# Patient Record
Sex: FEMALE | Race: WHITE | NOT HISPANIC OR LATINO | Employment: UNEMPLOYED | URBAN - METROPOLITAN AREA
[De-identification: names, ages, dates, MRNs, and addresses within clinical notes are randomized per-mention and may not be internally consistent; named-entity substitution may affect disease eponyms.]

---

## 2017-01-22 ENCOUNTER — CONVERSION ENCOUNTER (OUTPATIENT)
Dept: URGENT CARE | Facility: CLINIC | Age: 32
End: 2017-01-22

## 2017-01-23 ENCOUNTER — CONVERSION ENCOUNTER (OUTPATIENT)
Dept: URGENT CARE | Facility: CLINIC | Age: 32
End: 2017-01-23

## 2017-09-07 ENCOUNTER — CONVERSION ENCOUNTER (OUTPATIENT)
Dept: URGENT CARE | Facility: CLINIC | Age: 32
End: 2017-09-07

## 2017-09-29 ENCOUNTER — CONVERSION ENCOUNTER (OUTPATIENT)
Dept: URGENT CARE | Facility: CLINIC | Age: 32
End: 2017-09-29

## 2018-04-13 ENCOUNTER — CONVERSION ENCOUNTER (OUTPATIENT)
Dept: URGENT CARE | Facility: CLINIC | Age: 33
End: 2018-04-13

## 2018-04-26 ENCOUNTER — CONVERSION ENCOUNTER (OUTPATIENT)
Dept: URGENT CARE | Facility: CLINIC | Age: 33
End: 2018-04-26

## 2019-06-04 VITALS
HEART RATE: 84 BPM | SYSTOLIC BLOOD PRESSURE: 126 MMHG | SYSTOLIC BLOOD PRESSURE: 123 MMHG | HEART RATE: 99 BPM | OXYGEN SATURATION: 100 % | BODY MASS INDEX: 21.35 KG/M2 | WEIGHT: 113 LBS | HEIGHT: 61 IN | BODY MASS INDEX: 21.52 KG/M2 | HEART RATE: 87 BPM | OXYGEN SATURATION: 98 % | SYSTOLIC BLOOD PRESSURE: 103 MMHG | DIASTOLIC BLOOD PRESSURE: 90 MMHG | RESPIRATION RATE: 16 BRPM | WEIGHT: 114 LBS | OXYGEN SATURATION: 97 % | RESPIRATION RATE: 16 BRPM | OXYGEN SATURATION: 100 % | HEART RATE: 88 BPM | WEIGHT: 116 LBS | OXYGEN SATURATION: 100 % | DIASTOLIC BLOOD PRESSURE: 85 MMHG | BODY MASS INDEX: 21.34 KG/M2 | HEIGHT: 62 IN | RESPIRATION RATE: 20 BRPM | SYSTOLIC BLOOD PRESSURE: 150 MMHG | OXYGEN SATURATION: 100 % | WEIGHT: 116.4 LBS | WEIGHT: 114 LBS | HEART RATE: 68 BPM | SYSTOLIC BLOOD PRESSURE: 132 MMHG | HEART RATE: 91 BPM | DIASTOLIC BLOOD PRESSURE: 82 MMHG | RESPIRATION RATE: 16 BRPM | SYSTOLIC BLOOD PRESSURE: 110 MMHG | RESPIRATION RATE: 16 BRPM | DIASTOLIC BLOOD PRESSURE: 72 MMHG | HEIGHT: 61 IN | DIASTOLIC BLOOD PRESSURE: 80 MMHG | DIASTOLIC BLOOD PRESSURE: 64 MMHG

## 2019-07-31 ENCOUNTER — HOSPITAL ENCOUNTER (EMERGENCY)
Facility: HOSPITAL | Age: 34
Discharge: HOME | End: 2019-07-31
Admitting: EMERGENCY MEDICINE

## 2019-07-31 ENCOUNTER — APPOINTMENT (OUTPATIENT)
Dept: ULTRASOUND IMAGING | Facility: HOSPITAL | Age: 34
End: 2019-07-31

## 2019-07-31 VITALS
HEART RATE: 83 BPM | TEMPERATURE: 98.7 F | RESPIRATION RATE: 16 BRPM | WEIGHT: 115.3 LBS | SYSTOLIC BLOOD PRESSURE: 107 MMHG | BODY MASS INDEX: 21.77 KG/M2 | HEIGHT: 61 IN | OXYGEN SATURATION: 100 % | DIASTOLIC BLOOD PRESSURE: 73 MMHG

## 2019-07-31 DIAGNOSIS — R51.9 HEADACHE, UNSPECIFIED HEADACHE TYPE: Primary | ICD-10-CM

## 2019-07-31 DIAGNOSIS — O20.0 THREATENED ABORTION: ICD-10-CM

## 2019-07-31 LAB
ABO GROUP BLD: NORMAL
ALBUMIN SERPL-MCNC: 4.1 G/DL (ref 3.5–4.8)
ALBUMIN/GLOB SERPL: 1.3 G/DL (ref 1–1.7)
ALP SERPL-CCNC: 36 U/L (ref 32–91)
ALT SERPL W P-5'-P-CCNC: 17 U/L (ref 14–54)
ANION GAP SERPL CALCULATED.3IONS-SCNC: 13.7 MMOL/L (ref 5–15)
AST SERPL-CCNC: 17 U/L (ref 15–41)
B-HCG UR QL: POSITIVE
BASOPHILS # BLD AUTO: 0 10*3/MM3 (ref 0–0.2)
BASOPHILS NFR BLD AUTO: 0.5 % (ref 0–1.5)
BILIRUB SERPL-MCNC: 0.8 MG/DL (ref 0.3–1.2)
BUN BLD-MCNC: 10 MG/DL (ref 8–20)
BUN/CREAT SERPL: 16.7 (ref 5.4–26.2)
C TRACH RRNA CVX QL NAA+PROBE: NOT DETECTED
CALCIUM SPEC-SCNC: 9.3 MG/DL (ref 8.9–10.3)
CHLORIDE SERPL-SCNC: 105 MMOL/L (ref 101–111)
CLUE CELLS SPEC QL WET PREP: ABNORMAL
CO2 SERPL-SCNC: 19 MMOL/L (ref 22–32)
CREAT BLD-MCNC: 0.6 MG/DL (ref 0.4–1)
DEPRECATED RDW RBC AUTO: 38.9 FL (ref 37–54)
EOSINOPHIL # BLD AUTO: 0.1 10*3/MM3 (ref 0–0.4)
EOSINOPHIL NFR BLD AUTO: 0.6 % (ref 0.3–6.2)
ERYTHROCYTE [DISTWIDTH] IN BLOOD BY AUTOMATED COUNT: 12.7 % (ref 12.3–15.4)
GFR SERPL CREATININE-BSD FRML MDRD: 115 ML/MIN/1.73
GLOBULIN UR ELPH-MCNC: 3.1 GM/DL (ref 2.5–3.8)
GLUCOSE BLD-MCNC: 94 MG/DL (ref 65–99)
HCG INTACT+B SERPL-ACNC: NORMAL MIU/ML
HCT VFR BLD AUTO: 37.9 % (ref 34–46.6)
HGB BLD-MCNC: 13.2 G/DL (ref 12–15.9)
HOLD SPECIMEN: NORMAL
HOLD SPECIMEN: NORMAL
HYDATID CYST SPEC WET PREP: ABNORMAL
LYMPHOCYTES # BLD AUTO: 1.1 10*3/MM3 (ref 0.7–3.1)
LYMPHOCYTES NFR BLD AUTO: 14.2 % (ref 19.6–45.3)
MCH RBC QN AUTO: 30.3 PG (ref 26.6–33)
MCHC RBC AUTO-ENTMCNC: 34.8 G/DL (ref 31.5–35.7)
MCV RBC AUTO: 87.3 FL (ref 79–97)
MONOCYTES # BLD AUTO: 0.5 10*3/MM3 (ref 0.1–0.9)
MONOCYTES NFR BLD AUTO: 6.5 % (ref 5–12)
N GONORRHOEA RRNA SPEC QL NAA+PROBE: NOT DETECTED
NEUTROPHILS # BLD AUTO: 6.3 10*3/MM3 (ref 1.7–7)
NEUTROPHILS NFR BLD AUTO: 78.2 % (ref 42.7–76)
NRBC BLD AUTO-RTO: 0 /100 WBC (ref 0–0.2)
PLATELET # BLD AUTO: 256 10*3/MM3 (ref 140–450)
PMV BLD AUTO: 8.3 FL (ref 6–12)
POTASSIUM BLD-SCNC: 3.7 MMOL/L (ref 3.6–5.1)
PROT SERPL-MCNC: 7.2 G/DL (ref 6.1–7.9)
RBC # BLD AUTO: 4.34 10*6/MM3 (ref 3.77–5.28)
RH BLD: NEGATIVE
SODIUM BLD-SCNC: 134 MMOL/L (ref 136–144)
T VAGINALIS SPEC QL WET PREP: ABNORMAL
WBC NRBC COR # BLD: 8 10*3/MM3 (ref 3.4–10.8)
WBC SPEC QL WET PREP: ABNORMAL
WHOLE BLOOD HOLD SPECIMEN: NORMAL
WHOLE BLOOD HOLD SPECIMEN: NORMAL
YEAST GENITAL QL WET PREP: ABNORMAL

## 2019-07-31 PROCEDURE — 85025 COMPLETE CBC W/AUTO DIFF WBC: CPT | Performed by: NURSE PRACTITIONER

## 2019-07-31 PROCEDURE — 96361 HYDRATE IV INFUSION ADD-ON: CPT

## 2019-07-31 PROCEDURE — 87591 N.GONORRHOEAE DNA AMP PROB: CPT | Performed by: NURSE PRACTITIONER

## 2019-07-31 PROCEDURE — 87491 CHLMYD TRACH DNA AMP PROBE: CPT | Performed by: NURSE PRACTITIONER

## 2019-07-31 PROCEDURE — 25010000002 METOCLOPRAMIDE PER 10 MG: Performed by: NURSE PRACTITIONER

## 2019-07-31 PROCEDURE — 84702 CHORIONIC GONADOTROPIN TEST: CPT | Performed by: NURSE PRACTITIONER

## 2019-07-31 PROCEDURE — 99284 EMERGENCY DEPT VISIT MOD MDM: CPT

## 2019-07-31 PROCEDURE — 86900 BLOOD TYPING SEROLOGIC ABO: CPT | Performed by: NURSE PRACTITIONER

## 2019-07-31 PROCEDURE — 87210 SMEAR WET MOUNT SALINE/INK: CPT | Performed by: NURSE PRACTITIONER

## 2019-07-31 PROCEDURE — 96374 THER/PROPH/DIAG INJ IV PUSH: CPT

## 2019-07-31 PROCEDURE — 76817 TRANSVAGINAL US OBSTETRIC: CPT

## 2019-07-31 PROCEDURE — 86901 BLOOD TYPING SEROLOGIC RH(D): CPT | Performed by: NURSE PRACTITIONER

## 2019-07-31 PROCEDURE — 81025 URINE PREGNANCY TEST: CPT | Performed by: NURSE PRACTITIONER

## 2019-07-31 PROCEDURE — 76801 OB US < 14 WKS SINGLE FETUS: CPT

## 2019-07-31 PROCEDURE — 80053 COMPREHEN METABOLIC PANEL: CPT | Performed by: NURSE PRACTITIONER

## 2019-07-31 RX ORDER — SODIUM CHLORIDE 0.9 % (FLUSH) 0.9 %
10 SYRINGE (ML) INJECTION AS NEEDED
Status: DISCONTINUED | OUTPATIENT
Start: 2019-07-31 | End: 2019-07-31 | Stop reason: HOSPADM

## 2019-07-31 RX ORDER — METOCLOPRAMIDE HYDROCHLORIDE 5 MG/ML
10 INJECTION INTRAMUSCULAR; INTRAVENOUS ONCE
Status: COMPLETED | OUTPATIENT
Start: 2019-07-31 | End: 2019-07-31

## 2019-07-31 RX ORDER — PROMETHAZINE HYDROCHLORIDE 12.5 MG/1
12.5 TABLET ORAL EVERY 8 HOURS PRN
Qty: 15 TABLET | Refills: 0 | Status: SHIPPED | OUTPATIENT
Start: 2019-07-31 | End: 2019-08-05 | Stop reason: SDUPTHER

## 2019-07-31 RX ORDER — SODIUM CHLORIDE 9 MG/ML
125 INJECTION, SOLUTION INTRAVENOUS CONTINUOUS
Status: DISCONTINUED | OUTPATIENT
Start: 2019-07-31 | End: 2019-07-31 | Stop reason: HOSPADM

## 2019-07-31 RX ADMIN — METOCLOPRAMIDE 10 MG: 5 INJECTION, SOLUTION INTRAMUSCULAR; INTRAVENOUS at 12:39

## 2019-07-31 RX ADMIN — SODIUM CHLORIDE 1000 ML: 900 INJECTION, SOLUTION INTRAVENOUS at 12:43

## 2019-08-05 ENCOUNTER — APPOINTMENT (OUTPATIENT)
Dept: ULTRASOUND IMAGING | Facility: HOSPITAL | Age: 34
End: 2019-08-05

## 2019-08-05 ENCOUNTER — HOSPITAL ENCOUNTER (EMERGENCY)
Facility: HOSPITAL | Age: 34
Discharge: HOME | End: 2019-08-05
Admitting: EMERGENCY MEDICINE

## 2019-08-05 VITALS
HEIGHT: 61 IN | TEMPERATURE: 99.8 F | RESPIRATION RATE: 18 BRPM | BODY MASS INDEX: 21.81 KG/M2 | HEART RATE: 92 BPM | OXYGEN SATURATION: 97 % | DIASTOLIC BLOOD PRESSURE: 78 MMHG | WEIGHT: 115.52 LBS | SYSTOLIC BLOOD PRESSURE: 112 MMHG

## 2019-08-05 DIAGNOSIS — R11.2 NAUSEA AND VOMITING, INTRACTABILITY OF VOMITING NOT SPECIFIED, UNSPECIFIED VOMITING TYPE: ICD-10-CM

## 2019-08-05 DIAGNOSIS — Z3A.01 LESS THAN 8 WEEKS GESTATION OF PREGNANCY: ICD-10-CM

## 2019-08-05 DIAGNOSIS — R50.9 FEVER, UNSPECIFIED FEVER CAUSE: Primary | ICD-10-CM

## 2019-08-05 LAB
ALBUMIN SERPL-MCNC: 4 G/DL (ref 3.5–4.8)
ALBUMIN/GLOB SERPL: 1.3 G/DL (ref 1–1.7)
ALP SERPL-CCNC: 35 U/L (ref 32–91)
ALT SERPL W P-5'-P-CCNC: 15 U/L (ref 14–54)
ANION GAP SERPL CALCULATED.3IONS-SCNC: 15.6 MMOL/L (ref 5–15)
AST SERPL-CCNC: 16 U/L (ref 15–41)
BACTERIA UR QL AUTO: ABNORMAL /HPF
BASOPHILS # BLD AUTO: 0 10*3/MM3 (ref 0–0.2)
BASOPHILS NFR BLD AUTO: 0.2 % (ref 0–1.5)
BILIRUB SERPL-MCNC: 0.8 MG/DL (ref 0.3–1.2)
BILIRUB UR QL STRIP: ABNORMAL
BUN BLD-MCNC: 6 MG/DL (ref 8–20)
BUN/CREAT SERPL: 12 (ref 5.4–26.2)
CALCIUM SPEC-SCNC: 9.3 MG/DL (ref 8.9–10.3)
CHLORIDE SERPL-SCNC: 101 MMOL/L (ref 101–111)
CLARITY UR: ABNORMAL
CO2 SERPL-SCNC: 19 MMOL/L (ref 22–32)
COLOR UR: ABNORMAL
CREAT BLD-MCNC: 0.5 MG/DL (ref 0.4–1)
D-LACTATE SERPL-SCNC: 0.8 MMOL/L (ref 0.5–2)
DEPRECATED RDW RBC AUTO: 39.4 FL (ref 37–54)
EOSINOPHIL # BLD AUTO: 0 10*3/MM3 (ref 0–0.4)
EOSINOPHIL NFR BLD AUTO: 0 % (ref 0.3–6.2)
ERYTHROCYTE [DISTWIDTH] IN BLOOD BY AUTOMATED COUNT: 12.9 % (ref 12.3–15.4)
GFR SERPL CREATININE-BSD FRML MDRD: 142 ML/MIN/1.73
GLOBULIN UR ELPH-MCNC: 3 GM/DL (ref 2.5–3.8)
GLUCOSE BLD-MCNC: 95 MG/DL (ref 65–99)
GLUCOSE UR STRIP-MCNC: NEGATIVE MG/DL
HCG INTACT+B SERPL-ACNC: NORMAL MIU/ML
HCT VFR BLD AUTO: 38.3 % (ref 34–46.6)
HGB BLD-MCNC: 13.2 G/DL (ref 12–15.9)
HGB UR QL STRIP.AUTO: ABNORMAL
HYALINE CASTS UR QL AUTO: ABNORMAL /LPF
KETONES UR QL STRIP: ABNORMAL
LEUKOCYTE ESTERASE UR QL STRIP.AUTO: ABNORMAL
LIPASE SERPL-CCNC: 25 U/L (ref 22–51)
LYMPHOCYTES # BLD AUTO: 0.6 10*3/MM3 (ref 0.7–3.1)
LYMPHOCYTES NFR BLD AUTO: 6.9 % (ref 19.6–45.3)
MCH RBC QN AUTO: 29.9 PG (ref 26.6–33)
MCHC RBC AUTO-ENTMCNC: 34.4 G/DL (ref 31.5–35.7)
MCV RBC AUTO: 87 FL (ref 79–97)
MONOCYTES # BLD AUTO: 0.8 10*3/MM3 (ref 0.1–0.9)
MONOCYTES NFR BLD AUTO: 8.7 % (ref 5–12)
MUCOUS THREADS URNS QL MICRO: ABNORMAL /HPF
NEUTROPHILS # BLD AUTO: 7.5 10*3/MM3 (ref 1.7–7)
NEUTROPHILS NFR BLD AUTO: 84.2 % (ref 42.7–76)
NITRITE UR QL STRIP: NEGATIVE
NRBC BLD AUTO-RTO: 0 /100 WBC (ref 0–0.2)
PH UR STRIP.AUTO: 6.5 [PH] (ref 5–8)
PLATELET # BLD AUTO: 221 10*3/MM3 (ref 140–450)
PMV BLD AUTO: 8.3 FL (ref 6–12)
POTASSIUM BLD-SCNC: 3.6 MMOL/L (ref 3.6–5.1)
PROT SERPL-MCNC: 7 G/DL (ref 6.1–7.9)
PROT UR QL STRIP: ABNORMAL
RBC # BLD AUTO: 4.4 10*6/MM3 (ref 3.77–5.28)
RBC # UR: ABNORMAL /HPF
REF LAB TEST METHOD: ABNORMAL
SODIUM BLD-SCNC: 132 MMOL/L (ref 136–144)
SP GR UR STRIP: 1.03 (ref 1–1.03)
SQUAMOUS #/AREA URNS HPF: ABNORMAL /HPF
UROBILINOGEN UR QL STRIP: ABNORMAL
WBC NRBC COR # BLD: 8.9 10*3/MM3 (ref 3.4–10.8)
WBC UR QL AUTO: ABNORMAL /HPF

## 2019-08-05 PROCEDURE — 76802 OB US < 14 WKS ADDL FETUS: CPT

## 2019-08-05 PROCEDURE — 83690 ASSAY OF LIPASE: CPT | Performed by: PHYSICIAN ASSISTANT

## 2019-08-05 PROCEDURE — 84702 CHORIONIC GONADOTROPIN TEST: CPT | Performed by: PHYSICIAN ASSISTANT

## 2019-08-05 PROCEDURE — 25010000002 ONDANSETRON PER 1 MG: Performed by: PHYSICIAN ASSISTANT

## 2019-08-05 PROCEDURE — 99284 EMERGENCY DEPT VISIT MOD MDM: CPT

## 2019-08-05 PROCEDURE — 96361 HYDRATE IV INFUSION ADD-ON: CPT

## 2019-08-05 PROCEDURE — 76817 TRANSVAGINAL US OBSTETRIC: CPT

## 2019-08-05 PROCEDURE — 96374 THER/PROPH/DIAG INJ IV PUSH: CPT

## 2019-08-05 PROCEDURE — 85025 COMPLETE CBC W/AUTO DIFF WBC: CPT | Performed by: PHYSICIAN ASSISTANT

## 2019-08-05 PROCEDURE — 76801 OB US < 14 WKS SINGLE FETUS: CPT

## 2019-08-05 PROCEDURE — 80053 COMPREHEN METABOLIC PANEL: CPT | Performed by: PHYSICIAN ASSISTANT

## 2019-08-05 PROCEDURE — 83605 ASSAY OF LACTIC ACID: CPT

## 2019-08-05 PROCEDURE — 81001 URINALYSIS AUTO W/SCOPE: CPT | Performed by: PHYSICIAN ASSISTANT

## 2019-08-05 RX ORDER — CEFDINIR 300 MG/1
300 CAPSULE ORAL 2 TIMES DAILY
Qty: 14 CAPSULE | Refills: 0 | Status: SHIPPED | OUTPATIENT
Start: 2019-08-05 | End: 2019-08-25

## 2019-08-05 RX ORDER — SODIUM CHLORIDE 0.9 % (FLUSH) 0.9 %
10 SYRINGE (ML) INJECTION AS NEEDED
Status: DISCONTINUED | OUTPATIENT
Start: 2019-08-05 | End: 2019-08-05 | Stop reason: HOSPADM

## 2019-08-05 RX ORDER — ONDANSETRON 2 MG/ML
4 INJECTION INTRAMUSCULAR; INTRAVENOUS ONCE
Status: COMPLETED | OUTPATIENT
Start: 2019-08-05 | End: 2019-08-05

## 2019-08-05 RX ORDER — PROMETHAZINE HYDROCHLORIDE 12.5 MG/1
12.5 TABLET ORAL EVERY 8 HOURS PRN
Qty: 15 TABLET | Refills: 0 | Status: SHIPPED | OUTPATIENT
Start: 2019-08-05 | End: 2019-08-10

## 2019-08-05 RX ORDER — ACETAMINOPHEN 500 MG
1000 TABLET ORAL ONCE
Status: COMPLETED | OUTPATIENT
Start: 2019-08-05 | End: 2019-08-05

## 2019-08-05 RX ADMIN — ONDANSETRON 4 MG: 2 INJECTION INTRAMUSCULAR; INTRAVENOUS at 17:29

## 2019-08-05 RX ADMIN — ACETAMINOPHEN 1000 MG: 500 TABLET, FILM COATED ORAL at 20:08

## 2019-08-05 RX ADMIN — SODIUM CHLORIDE 1000 ML: 900 INJECTION, SOLUTION INTRAVENOUS at 17:29

## 2019-08-06 NOTE — DISCHARGE INSTRUCTIONS
Follow-up with your primary care provider in 3-5 days.  If you do not have a primary care provider call [phone] for help in finding one, or you may follow up with [facility] at [phone].    Keep your appointment with OB/GYN on Wednesday.    Tylenol as needed for fever.  Drink Plenty of clear fluids    Return To ED for any new or worsening symptoms.      Take Cefdinir as prescribed.  Be sure to take full course.

## 2019-08-24 ENCOUNTER — APPOINTMENT (OUTPATIENT)
Dept: ULTRASOUND IMAGING | Facility: HOSPITAL | Age: 34
End: 2019-08-24

## 2019-08-24 ENCOUNTER — HOSPITAL ENCOUNTER (EMERGENCY)
Facility: HOSPITAL | Age: 34
Discharge: HOME | End: 2019-08-24
Admitting: EMERGENCY MEDICINE

## 2019-08-24 VITALS
RESPIRATION RATE: 16 BRPM | DIASTOLIC BLOOD PRESSURE: 62 MMHG | HEIGHT: 61 IN | SYSTOLIC BLOOD PRESSURE: 109 MMHG | WEIGHT: 114.2 LBS | BODY MASS INDEX: 21.56 KG/M2 | OXYGEN SATURATION: 99 % | HEART RATE: 99 BPM | TEMPERATURE: 98.9 F

## 2019-08-24 DIAGNOSIS — O20.0 THREATENED MISCARRIAGE: ICD-10-CM

## 2019-08-24 DIAGNOSIS — O20.9 VAGINAL BLEEDING IN PREGNANCY, FIRST TRIMESTER: Primary | ICD-10-CM

## 2019-08-24 LAB
ABO GROUP BLD: NORMAL
ALBUMIN SERPL-MCNC: 4 G/DL (ref 3.5–4.8)
ALBUMIN/GLOB SERPL: 1.2 G/DL (ref 1–1.7)
ALP SERPL-CCNC: 40 U/L (ref 32–91)
ALT SERPL W P-5'-P-CCNC: 15 U/L (ref 14–54)
ANION GAP SERPL CALCULATED.3IONS-SCNC: 18.1 MMOL/L (ref 5–15)
AST SERPL-CCNC: 17 U/L (ref 15–41)
BACTERIA UR QL AUTO: ABNORMAL /HPF
BASOPHILS # BLD AUTO: 0.1 10*3/MM3 (ref 0–0.2)
BASOPHILS NFR BLD AUTO: 0.5 % (ref 0–1.5)
BILIRUB SERPL-MCNC: 0.6 MG/DL (ref 0.3–1.2)
BILIRUB UR QL STRIP: NEGATIVE
BUN BLD-MCNC: 8 MG/DL (ref 8–20)
BUN/CREAT SERPL: 13.3 (ref 5.4–26.2)
CALCIUM SPEC-SCNC: 9.5 MG/DL (ref 8.9–10.3)
CHLORIDE SERPL-SCNC: 102 MMOL/L (ref 101–111)
CLARITY UR: CLEAR
CO2 SERPL-SCNC: 21 MMOL/L (ref 22–32)
COLOR UR: YELLOW
CREAT BLD-MCNC: 0.6 MG/DL (ref 0.4–1)
CRP SERPL-MCNC: 0.05 MG/DL (ref 0–0.7)
DEPRECATED RDW RBC AUTO: 40.3 FL (ref 37–54)
EOSINOPHIL # BLD AUTO: 0.1 10*3/MM3 (ref 0–0.4)
EOSINOPHIL NFR BLD AUTO: 1.3 % (ref 0.3–6.2)
ERYTHROCYTE [DISTWIDTH] IN BLOOD BY AUTOMATED COUNT: 13.3 % (ref 12.3–15.4)
GFR SERPL CREATININE-BSD FRML MDRD: 115 ML/MIN/1.73
GLOBULIN UR ELPH-MCNC: 3.3 GM/DL (ref 2.5–3.8)
GLUCOSE BLD-MCNC: 92 MG/DL (ref 65–99)
GLUCOSE UR STRIP-MCNC: NEGATIVE MG/DL
HCG INTACT+B SERPL-ACNC: NORMAL MIU/ML
HCT VFR BLD AUTO: 36 % (ref 34–46.6)
HGB BLD-MCNC: 12.6 G/DL (ref 12–15.9)
HGB UR QL STRIP.AUTO: ABNORMAL
HOLD SPECIMEN: NORMAL
HYALINE CASTS UR QL AUTO: ABNORMAL /LPF
KETONES UR QL STRIP: ABNORMAL
LEUKOCYTE ESTERASE UR QL STRIP.AUTO: NEGATIVE
LIPASE SERPL-CCNC: 26 U/L (ref 22–51)
LYMPHOCYTES # BLD AUTO: 1.9 10*3/MM3 (ref 0.7–3.1)
LYMPHOCYTES NFR BLD AUTO: 18.5 % (ref 19.6–45.3)
MCH RBC QN AUTO: 29.9 PG (ref 26.6–33)
MCHC RBC AUTO-ENTMCNC: 35 G/DL (ref 31.5–35.7)
MCV RBC AUTO: 85.6 FL (ref 79–97)
MONOCYTES # BLD AUTO: 0.6 10*3/MM3 (ref 0.1–0.9)
MONOCYTES NFR BLD AUTO: 5.6 % (ref 5–12)
NEUTROPHILS # BLD AUTO: 7.8 10*3/MM3 (ref 1.7–7)
NEUTROPHILS NFR BLD AUTO: 74.1 % (ref 42.7–76)
NITRITE UR QL STRIP: NEGATIVE
NRBC BLD AUTO-RTO: 0 /100 WBC (ref 0–0.2)
PH UR STRIP.AUTO: 6.5 [PH] (ref 5–8)
PLATELET # BLD AUTO: 390 10*3/MM3 (ref 140–450)
PMV BLD AUTO: 8 FL (ref 6–12)
POTASSIUM BLD-SCNC: 4.1 MMOL/L (ref 3.6–5.1)
PROT SERPL-MCNC: 7.3 G/DL (ref 6.1–7.9)
PROT UR QL STRIP: NEGATIVE
RBC # BLD AUTO: 4.2 10*6/MM3 (ref 3.77–5.28)
RBC # UR: ABNORMAL /HPF
REF LAB TEST METHOD: ABNORMAL
RH BLD: NEGATIVE
SODIUM BLD-SCNC: 137 MMOL/L (ref 136–144)
SP GR UR STRIP: <=1.005 (ref 1–1.03)
SQUAMOUS #/AREA URNS HPF: ABNORMAL /HPF
UROBILINOGEN UR QL STRIP: ABNORMAL
WBC NRBC COR # BLD: 10.5 10*3/MM3 (ref 3.4–10.8)
WBC UR QL AUTO: ABNORMAL /HPF

## 2019-08-24 PROCEDURE — 83690 ASSAY OF LIPASE: CPT | Performed by: PHYSICIAN ASSISTANT

## 2019-08-24 PROCEDURE — 96375 TX/PRO/DX INJ NEW DRUG ADDON: CPT

## 2019-08-24 PROCEDURE — 99284 EMERGENCY DEPT VISIT MOD MDM: CPT

## 2019-08-24 PROCEDURE — 25010000002 ONDANSETRON PER 1 MG: Performed by: NURSE PRACTITIONER

## 2019-08-24 PROCEDURE — 80053 COMPREHEN METABOLIC PANEL: CPT | Performed by: PHYSICIAN ASSISTANT

## 2019-08-24 PROCEDURE — 86900 BLOOD TYPING SEROLOGIC ABO: CPT | Performed by: PHYSICIAN ASSISTANT

## 2019-08-24 PROCEDURE — 84702 CHORIONIC GONADOTROPIN TEST: CPT | Performed by: PHYSICIAN ASSISTANT

## 2019-08-24 PROCEDURE — 25010000002 ONDANSETRON PER 1 MG: Performed by: PHYSICIAN ASSISTANT

## 2019-08-24 PROCEDURE — 96374 THER/PROPH/DIAG INJ IV PUSH: CPT

## 2019-08-24 PROCEDURE — 96376 TX/PRO/DX INJ SAME DRUG ADON: CPT

## 2019-08-24 PROCEDURE — 86901 BLOOD TYPING SEROLOGIC RH(D): CPT | Performed by: PHYSICIAN ASSISTANT

## 2019-08-24 PROCEDURE — 85025 COMPLETE CBC W/AUTO DIFF WBC: CPT | Performed by: PHYSICIAN ASSISTANT

## 2019-08-24 PROCEDURE — 76801 OB US < 14 WKS SINGLE FETUS: CPT

## 2019-08-24 PROCEDURE — 81001 URINALYSIS AUTO W/SCOPE: CPT | Performed by: PHYSICIAN ASSISTANT

## 2019-08-24 PROCEDURE — 86140 C-REACTIVE PROTEIN: CPT | Performed by: NURSE PRACTITIONER

## 2019-08-24 PROCEDURE — 96361 HYDRATE IV INFUSION ADD-ON: CPT

## 2019-08-24 PROCEDURE — 25010000002 MORPHINE PER 10 MG: Performed by: NURSE PRACTITIONER

## 2019-08-24 RX ORDER — SODIUM CHLORIDE 0.9 % (FLUSH) 0.9 %
10 SYRINGE (ML) INJECTION AS NEEDED
Status: DISCONTINUED | OUTPATIENT
Start: 2019-08-24 | End: 2019-08-25 | Stop reason: HOSPADM

## 2019-08-24 RX ORDER — HYDROCODONE BITARTRATE AND ACETAMINOPHEN 5; 325 MG/1; MG/1
1 TABLET ORAL EVERY 4 HOURS PRN
Qty: 15 TABLET | Refills: 0 | Status: SHIPPED | OUTPATIENT
Start: 2019-08-24 | End: 2019-08-25

## 2019-08-24 RX ORDER — ONDANSETRON 4 MG/1
4 TABLET, ORALLY DISINTEGRATING ORAL EVERY 6 HOURS PRN
Qty: 15 TABLET | Refills: 0 | Status: SHIPPED | OUTPATIENT
Start: 2019-08-24 | End: 2019-08-25

## 2019-08-24 RX ORDER — MORPHINE SULFATE 4 MG/ML
4 INJECTION, SOLUTION INTRAMUSCULAR; INTRAVENOUS ONCE
Status: COMPLETED | OUTPATIENT
Start: 2019-08-24 | End: 2019-08-24

## 2019-08-24 RX ORDER — ONDANSETRON 2 MG/ML
4 INJECTION INTRAMUSCULAR; INTRAVENOUS ONCE
Status: COMPLETED | OUTPATIENT
Start: 2019-08-24 | End: 2019-08-24

## 2019-08-24 RX ADMIN — SODIUM CHLORIDE 1000 ML: 0.9 INJECTION, SOLUTION INTRAVENOUS at 21:25

## 2019-08-24 RX ADMIN — MORPHINE SULFATE 4 MG: 4 INJECTION INTRAVENOUS at 23:06

## 2019-08-24 RX ADMIN — ONDANSETRON 4 MG: 2 INJECTION INTRAMUSCULAR; INTRAVENOUS at 21:03

## 2019-08-24 RX ADMIN — SODIUM CHLORIDE 1000 ML: 0.9 INJECTION, SOLUTION INTRAVENOUS at 20:25

## 2019-08-24 RX ADMIN — ONDANSETRON 4 MG: 2 INJECTION INTRAMUSCULAR; INTRAVENOUS at 20:26

## 2019-08-24 RX ADMIN — MORPHINE SULFATE 4 MG: 4 INJECTION INTRAVENOUS at 21:02

## 2019-08-25 ENCOUNTER — APPOINTMENT (OUTPATIENT)
Dept: ULTRASOUND IMAGING | Facility: HOSPITAL | Age: 34
End: 2019-08-25

## 2019-08-25 ENCOUNTER — HOSPITAL ENCOUNTER (OUTPATIENT)
Facility: HOSPITAL | Age: 34
Setting detail: OBSERVATION
Discharge: HOME | End: 2019-08-26
Attending: EMERGENCY MEDICINE | Admitting: OBSTETRICS & GYNECOLOGY

## 2019-08-25 DIAGNOSIS — O03.9 MISCARRIAGE: Primary | ICD-10-CM

## 2019-08-25 LAB
ABO GROUP BLD: NORMAL
BASOPHILS # BLD AUTO: 0 10*3/MM3 (ref 0–0.2)
BASOPHILS NFR BLD AUTO: 0.4 % (ref 0–1.5)
BLD GP AB SCN SERPL QL: POSITIVE
DEPRECATED RDW RBC AUTO: 39.8 FL (ref 37–54)
EOSINOPHIL # BLD AUTO: 0.1 10*3/MM3 (ref 0–0.4)
EOSINOPHIL NFR BLD AUTO: 0.6 % (ref 0.3–6.2)
ERYTHROCYTE [DISTWIDTH] IN BLOOD BY AUTOMATED COUNT: 13.2 % (ref 12.3–15.4)
HCG INTACT+B SERPL-ACNC: NORMAL MIU/ML
HCT VFR BLD AUTO: 32.9 % (ref 34–46.6)
HGB BLD-MCNC: 11.8 G/DL (ref 12–15.9)
LYMPHOCYTES # BLD AUTO: 1.2 10*3/MM3 (ref 0.7–3.1)
LYMPHOCYTES NFR BLD AUTO: 8.7 % (ref 19.6–45.3)
MCH RBC QN AUTO: 30.8 PG (ref 26.6–33)
MCHC RBC AUTO-ENTMCNC: 36 G/DL (ref 31.5–35.7)
MCV RBC AUTO: 85.6 FL (ref 79–97)
MONOCYTES # BLD AUTO: 0.6 10*3/MM3 (ref 0.1–0.9)
MONOCYTES NFR BLD AUTO: 4.6 % (ref 5–12)
NEUTROPHILS # BLD AUTO: 11.8 10*3/MM3 (ref 1.7–7)
NEUTROPHILS NFR BLD AUTO: 85.7 % (ref 42.7–76)
NRBC BLD AUTO-RTO: 0 /100 WBC (ref 0–0.2)
PLATELET # BLD AUTO: 360 10*3/MM3 (ref 140–450)
PMV BLD AUTO: 7.9 FL (ref 6–12)
RBC # BLD AUTO: 3.84 10*6/MM3 (ref 3.77–5.28)
RESIDUAL RHIG DETECTED: NORMAL
RH BLD: NEGATIVE
T&S EXPIRATION DATE: NORMAL
WBC NRBC COR # BLD: 13.8 10*3/MM3 (ref 3.4–10.8)
WHOLE BLOOD HOLD SPECIMEN: NORMAL

## 2019-08-25 PROCEDURE — 96374 THER/PROPH/DIAG INJ IV PUSH: CPT

## 2019-08-25 PROCEDURE — 86850 RBC ANTIBODY SCREEN: CPT | Performed by: EMERGENCY MEDICINE

## 2019-08-25 PROCEDURE — 76817 TRANSVAGINAL US OBSTETRIC: CPT

## 2019-08-25 PROCEDURE — 25010000002 KETOROLAC TROMETHAMINE PER 15 MG: Performed by: EMERGENCY MEDICINE

## 2019-08-25 PROCEDURE — 86901 BLOOD TYPING SEROLOGIC RH(D): CPT | Performed by: EMERGENCY MEDICINE

## 2019-08-25 PROCEDURE — 99284 EMERGENCY DEPT VISIT MOD MDM: CPT

## 2019-08-25 PROCEDURE — 86900 BLOOD TYPING SEROLOGIC ABO: CPT | Performed by: EMERGENCY MEDICINE

## 2019-08-25 PROCEDURE — G0378 HOSPITAL OBSERVATION PER HR: HCPCS

## 2019-08-25 PROCEDURE — 84702 CHORIONIC GONADOTROPIN TEST: CPT | Performed by: EMERGENCY MEDICINE

## 2019-08-25 PROCEDURE — 25010000002 ONDANSETRON PER 1 MG: Performed by: EMERGENCY MEDICINE

## 2019-08-25 PROCEDURE — 76815 OB US LIMITED FETUS(S): CPT

## 2019-08-25 PROCEDURE — 86870 RBC ANTIBODY IDENTIFICATION: CPT | Performed by: EMERGENCY MEDICINE

## 2019-08-25 PROCEDURE — 96375 TX/PRO/DX INJ NEW DRUG ADDON: CPT

## 2019-08-25 PROCEDURE — 96376 TX/PRO/DX INJ SAME DRUG ADON: CPT

## 2019-08-25 PROCEDURE — 25010000002 MORPHINE PER 10 MG: Performed by: EMERGENCY MEDICINE

## 2019-08-25 PROCEDURE — 85025 COMPLETE CBC W/AUTO DIFF WBC: CPT | Performed by: EMERGENCY MEDICINE

## 2019-08-25 PROCEDURE — 25010000002 LORAZEPAM PER 2 MG: Performed by: EMERGENCY MEDICINE

## 2019-08-25 PROCEDURE — 25010000002 MORPHINE PER 10 MG: Performed by: OBSTETRICS & GYNECOLOGY

## 2019-08-25 RX ORDER — MORPHINE SULFATE 4 MG/ML
4 INJECTION, SOLUTION INTRAMUSCULAR; INTRAVENOUS ONCE
Status: COMPLETED | OUTPATIENT
Start: 2019-08-25 | End: 2019-08-25

## 2019-08-25 RX ORDER — MORPHINE SULFATE 4 MG/ML
2 INJECTION, SOLUTION INTRAMUSCULAR; INTRAVENOUS
Status: DISCONTINUED | OUTPATIENT
Start: 2019-08-25 | End: 2019-08-26

## 2019-08-25 RX ORDER — LORAZEPAM 2 MG/ML
0.5 INJECTION INTRAMUSCULAR ONCE
Status: COMPLETED | OUTPATIENT
Start: 2019-08-25 | End: 2019-08-25

## 2019-08-25 RX ORDER — SODIUM CHLORIDE 0.9 % (FLUSH) 0.9 %
10 SYRINGE (ML) INJECTION AS NEEDED
Status: DISCONTINUED | OUTPATIENT
Start: 2019-08-25 | End: 2019-08-26 | Stop reason: HOSPADM

## 2019-08-25 RX ORDER — KETOROLAC TROMETHAMINE 15 MG/ML
15 INJECTION, SOLUTION INTRAMUSCULAR; INTRAVENOUS ONCE
Status: COMPLETED | OUTPATIENT
Start: 2019-08-25 | End: 2019-08-25

## 2019-08-25 RX ORDER — ONDANSETRON 2 MG/ML
4 INJECTION INTRAMUSCULAR; INTRAVENOUS ONCE
Status: COMPLETED | OUTPATIENT
Start: 2019-08-25 | End: 2019-08-25

## 2019-08-25 RX ORDER — ONDANSETRON 2 MG/ML
4 INJECTION INTRAMUSCULAR; INTRAVENOUS EVERY 6 HOURS PRN
Status: DISCONTINUED | OUTPATIENT
Start: 2019-08-25 | End: 2019-08-26 | Stop reason: HOSPADM

## 2019-08-25 RX ORDER — ESCITALOPRAM OXALATE 10 MG/1
10 TABLET ORAL DAILY
COMMUNITY
End: 2020-05-13 | Stop reason: ALTCHOICE

## 2019-08-25 RX ADMIN — LORAZEPAM 0.5 MG: 2 INJECTION INTRAMUSCULAR; INTRAVENOUS at 19:31

## 2019-08-25 RX ADMIN — MORPHINE SULFATE 2 MG: 4 INJECTION INTRAVENOUS at 23:11

## 2019-08-25 RX ADMIN — SODIUM CHLORIDE 1000 ML: 900 INJECTION, SOLUTION INTRAVENOUS at 18:39

## 2019-08-25 RX ADMIN — ONDANSETRON 4 MG: 2 INJECTION INTRAMUSCULAR; INTRAVENOUS at 18:39

## 2019-08-25 RX ADMIN — MORPHINE SULFATE 4 MG: 4 INJECTION INTRAVENOUS at 18:40

## 2019-08-25 RX ADMIN — MORPHINE SULFATE 4 MG: 4 INJECTION INTRAVENOUS at 19:14

## 2019-08-25 RX ADMIN — KETOROLAC TROMETHAMINE 15 MG: 15 INJECTION, SOLUTION INTRAMUSCULAR; INTRAVENOUS at 19:14

## 2019-08-26 VITALS
HEIGHT: 61 IN | TEMPERATURE: 98.1 F | DIASTOLIC BLOOD PRESSURE: 65 MMHG | SYSTOLIC BLOOD PRESSURE: 112 MMHG | OXYGEN SATURATION: 98 % | RESPIRATION RATE: 17 BRPM | HEART RATE: 83 BPM | WEIGHT: 113.76 LBS | BODY MASS INDEX: 21.48 KG/M2

## 2019-08-26 LAB
BASOPHILS # BLD AUTO: 0 10*3/MM3 (ref 0–0.2)
BASOPHILS NFR BLD AUTO: 0.5 % (ref 0–1.5)
DEPRECATED RDW RBC AUTO: 41.1 FL (ref 37–54)
EOSINOPHIL # BLD AUTO: 0.2 10*3/MM3 (ref 0–0.4)
EOSINOPHIL NFR BLD AUTO: 1.5 % (ref 0.3–6.2)
ERYTHROCYTE [DISTWIDTH] IN BLOOD BY AUTOMATED COUNT: 13.4 % (ref 12.3–15.4)
HCG INTACT+B SERPL-ACNC: NORMAL MIU/ML
HCT VFR BLD AUTO: 27.9 % (ref 34–46.6)
HGB BLD-MCNC: 9.9 G/DL (ref 12–15.9)
LYMPHOCYTES # BLD AUTO: 2.2 10*3/MM3 (ref 0.7–3.1)
LYMPHOCYTES NFR BLD AUTO: 22.9 % (ref 19.6–45.3)
MCH RBC QN AUTO: 30.8 PG (ref 26.6–33)
MCHC RBC AUTO-ENTMCNC: 35.5 G/DL (ref 31.5–35.7)
MCV RBC AUTO: 86.7 FL (ref 79–97)
MONOCYTES # BLD AUTO: 0.8 10*3/MM3 (ref 0.1–0.9)
MONOCYTES NFR BLD AUTO: 8.6 % (ref 5–12)
NEUTROPHILS # BLD AUTO: 6.5 10*3/MM3 (ref 1.7–7)
NEUTROPHILS NFR BLD AUTO: 66.5 % (ref 42.7–76)
NRBC BLD AUTO-RTO: 0.1 /100 WBC (ref 0–0.2)
PLATELET # BLD AUTO: 291 10*3/MM3 (ref 140–450)
PMV BLD AUTO: 7.9 FL (ref 6–12)
RBC # BLD AUTO: 3.22 10*6/MM3 (ref 3.77–5.28)
WBC NRBC COR # BLD: 9.7 10*3/MM3 (ref 3.4–10.8)

## 2019-08-26 PROCEDURE — 96376 TX/PRO/DX INJ SAME DRUG ADON: CPT

## 2019-08-26 PROCEDURE — 84702 CHORIONIC GONADOTROPIN TEST: CPT | Performed by: OBSTETRICS & GYNECOLOGY

## 2019-08-26 PROCEDURE — G0378 HOSPITAL OBSERVATION PER HR: HCPCS

## 2019-08-26 PROCEDURE — 85025 COMPLETE CBC W/AUTO DIFF WBC: CPT | Performed by: OBSTETRICS & GYNECOLOGY

## 2019-08-26 PROCEDURE — 25010000002 MORPHINE PER 10 MG: Performed by: OBSTETRICS & GYNECOLOGY

## 2019-08-26 RX ORDER — CYCLOBENZAPRINE HCL 10 MG
5 TABLET ORAL ONCE
Status: COMPLETED | OUTPATIENT
Start: 2019-08-26 | End: 2019-08-26

## 2019-08-26 RX ORDER — OXYCODONE HYDROCHLORIDE 5 MG/1
5 TABLET ORAL EVERY 4 HOURS PRN
Qty: 10 TABLET | Refills: 0 | Status: SHIPPED | OUTPATIENT
Start: 2019-08-26 | End: 2019-09-05

## 2019-08-26 RX ORDER — OXYCODONE HYDROCHLORIDE 5 MG/1
5 TABLET ORAL EVERY 4 HOURS PRN
Status: DISCONTINUED | OUTPATIENT
Start: 2019-08-26 | End: 2019-08-26 | Stop reason: HOSPADM

## 2019-08-26 RX ADMIN — OXYCODONE HYDROCHLORIDE 5 MG: 5 TABLET ORAL at 09:20

## 2019-08-26 RX ADMIN — CYCLOBENZAPRINE HYDROCHLORIDE 5 MG: 10 TABLET, FILM COATED ORAL at 11:07

## 2019-08-26 RX ADMIN — MORPHINE SULFATE 2 MG: 4 INJECTION INTRAVENOUS at 04:42

## 2019-08-26 RX ADMIN — MORPHINE SULFATE 2 MG: 4 INJECTION INTRAVENOUS at 07:16

## 2019-08-27 ENCOUNTER — READMISSION MANAGEMENT (OUTPATIENT)
Dept: CALL CENTER | Facility: HOSPITAL | Age: 34
End: 2019-08-27

## 2019-08-27 NOTE — OUTREACH NOTE
Prep Survey      Responses   Facility patient discharged from?  [facility]   Is patient eligible?  No   What are the reasons patient is not eligible?  Other [OB PATIENT WITH MISCARRIAGE]   Does the patient have one of the following disease processes/diagnoses(primary or secondary)?  Other   Prep survey completed?  Yes          Marizol Molina LPN

## 2020-05-13 ENCOUNTER — TELEMEDICINE (OUTPATIENT)
Dept: PSYCHIATRY | Facility: CLINIC | Age: 35
End: 2020-05-13

## 2020-05-13 DIAGNOSIS — F33.1 MAJOR DEPRESSIVE DISORDER, RECURRENT EPISODE, MODERATE (HCC): Primary | ICD-10-CM

## 2020-05-13 DIAGNOSIS — F41.1 GENERALIZED ANXIETY DISORDER: ICD-10-CM

## 2020-05-13 PROBLEM — O03.9 MISCARRIAGE: Status: RESOLVED | Noted: 2019-08-25 | Resolved: 2020-05-13

## 2020-05-13 PROCEDURE — 90792 PSYCH DIAG EVAL W/MED SRVCS: CPT | Performed by: PHYSICIAN ASSISTANT

## 2020-05-13 RX ORDER — BUSPIRONE HYDROCHLORIDE 10 MG/1
10 TABLET ORAL 2 TIMES DAILY
Qty: 60 TABLET | Refills: 2 | Status: SHIPPED | OUTPATIENT
Start: 2020-05-13 | End: 2020-07-13 | Stop reason: SDUPTHER

## 2020-05-13 NOTE — PROGRESS NOTES
"Subjective   Ora Lynn is a 35 y.o. female who presents today for initial evaluation via video visit x 45 minutes    Chief Complaint:  Anxiety and depression    History of Present Illness:   14 month old son (Ford), Dr. Afua Moe recommended, had post-partum depression, started her on Zoloft but was scared to take during pregnancy   hasn't gotten to enjoy being a mom  Got pregnant again and had a miscarriage in Sept 2019 No , denied for assistance, only works part-time, lost her full-time job    \"I have had it my whole life but fought it\"  Crying for no reason  Depression 7/10  Anxiety 8/10  Denies SI/HI  No billie or hypomania  Gets anxious around people  Single Mom, her mother is 8 hrs away in [place], [place] and they are close  Always afraid, worries about her mom dying or getting into an accident, or her son getting sick, getting into an accident  Terrified of taking medication  Triggers: raising son alone, her ex (has his own anxiety and drinks too much, makes her more anxious)    The following portions of the patient's history were reviewed and updated as appropriate: allergies, current medications, past family history, past medical history, past social history, past surgical history and problem list.    PAST PSYCHIATRIC HISTORY  Axis I  Affective/Bipoloar Disorder, Anxiety/Panic Disorder  Axis II  None    PAST OUTPATIENT TREATMENT  Diagnosis treated:  Affective Disorder, Anxiety/Panic Disorder  Treatment Type:  Individual Therapy, Medication Management, Supportive Counseling (Personal Counseling Services briefly)  No hospitalizations  Prior Psychiatric Medications:  Zoloft, really didn't take routinely  Lexapro, did not help  Support Groups:  None  Sequelae Of Mental Disorder:  social isolation, family disruption, emotional distress      Interval History  No Change    Side Effects  None      Past Medical History:  Past Medical History:   Diagnosis Date   • Depression        Social " History:  Social History     Socioeconomic History   • Marital status: Single     Spouse name: Not on file   • Number of children: Not on file   • Years of education: Not on file   • Highest education level: Not on file   Tobacco Use   • Smoking status: Never Smoker   • Smokeless tobacco: Never Used   Substance and Sexual Activity   • Alcohol use: Yes     Alcohol/week: 1.0 standard drinks     Types: 1 Glasses of wine per week     Comment: socially   • Drug use: No   • Sexual activity: Not Currently     Partners: Male       Family History:  Family History   Problem Relation Age of Onset   • Alcohol abuse Father        Past Surgical History:  History reviewed. No pertinent surgical history.    Problem List:  Patient Active Problem List   Diagnosis   • Miscarriage       Allergy:   No Known Allergies     Discontinued Medications:  Medications Discontinued During This Encounter   Medication Reason   • escitalopram (LEXAPRO) 10 MG tablet Alternate therapy       Current Medications:   Current Outpatient Medications   Medication Sig Dispense Refill   • BLISOVI FE 1/20 1-20 MG-MCG per tablet      • busPIRone (BUSPAR) 10 MG tablet Take 1 tablet by mouth 2 (Two) Times a Day. 60 tablet 2     No current facility-administered medications for this visit.          Review of Symptoms:    Psychiatric/Behavioral: Negative for agitation, behavioral problems, confusion, decreased concentration, hallucinations, self-injury, sleep disturbance and suicidal ideas. The patient is nervous/anxious with dysphoric mood and is not hyperactive.        Physical Exam:   not currently breastfeeding.    Mental Status Exam:   Hygiene:   good  Cooperation:  Cooperative  Eye Contact:  Good  Psychomotor Behavior:  Appropriate  Affect:  Appropriate  Mood: sad, anxious  Hopelessness: Denies  Speech:  Normal  Thought Process:  Goal directed  Thought Content:  Normal  Suicidal:  None  Homicidal:  None  Hallucinations:  None  Delusion:  None  Memory:   Intact  Orientation:  Person, Place, Time and Situation  Reliability:  good  Insight:  Good  Judgement:  Good  Impulse Control:  Good  Physical/Medical Issues:  No        PHQ-9 Depression Screening  Little interest or pleasure in doing things? 2   Feeling down, depressed, or hopeless? 3   Trouble falling or staying asleep, or sleeping too much? 1   Feeling tired or having little energy? 2   Poor appetite or overeating? 1   Feeling bad about yourself - or that you are a failure or have let yourself or your family down? 2   Trouble concentrating on things, such as reading the newspaper or watching television? 1   Moving or speaking so slowly that other people could have noticed? Or the opposite - being so fidgety or restless that you have been moving around a lot more than usual? 0   Thoughts that you would be better off dead, or of hurting yourself in some way? 0   PHQ-9 Total Score 12   If you checked off any problems, how difficult have these problems made it for you to do your work, take care of things at home, or get along with other people? Very difficult           Never smoker    I advised Ora of the risks of tobacco use.     Lab Results:   Admission on 03/09/2020, Discharged on 03/09/2020   Component Date Value Ref Range Status   • Rapid Influenza A Ag 03/09/2020 Negative  Negative Final   • Rapid Influenza B Ag 03/09/2020 Negative  Negative Final   • Internal Control 03/09/2020 Passed  Passed Final   • Lot Number 03/09/2020 [id number]   Final   • Expiration Date 03/09/2020 11/17/2022   Final       Assessment/Plan   Problems Addressed this Visit     None      Visit Diagnoses     Major depressive disorder, recurrent episode, moderate (CMS/HCC)    -  Primary    Relevant Medications    busPIRone (BUSPAR) 10 MG tablet    Generalized anxiety disorder        Relevant Medications    busPIRone (BUSPAR) 10 MG tablet          Visit Diagnoses:    ICD-10-CM ICD-9-CM   1. Major depressive disorder, recurrent episode,  moderate (CMS/HCC) F33.1 296.32   2. Generalized anxiety disorder F41.1 300.02       TREATMENT PLAN/GOALS: Continue supportive psychotherapy efforts and medications as indicated. Treatment and medication options discussed during today's visit. Patient ackowledged and verbally consented to continue with current treatment plan and was educated on the importance of compliance with treatment and follow-up appointments.    MEDICATION ISSUES:  INSPECT reviewed as expected  Discussed medication options and treatment plan of prescribed medication as well as the risks, benefits, and side effects including potential falls, possible impaired driving and metabolic adversities among others. Patient is agreeable to call the office with any worsening of symptoms or onset of side effects. Patient is agreeable to call 911 or go to the nearest ER should he/she begin having SI/HI. No medication side effects or related complaints today.     Patient has long hx of depression and anxiety but has not had it treated properly, scared of taking meds that are not right for her.  Will have [employer] testing kit sent to her house to assess the best practice medications for her.  Will call her to discuss which antidepressant recommended for her.  Send referral list for counselors.  Start Buspar 10mg BID for anxiety in the meantime    MEDS ORDERED DURING VISIT:  New Medications Ordered This Visit   Medications   • busPIRone (BUSPAR) 10 MG tablet     Sig: Take 1 tablet by mouth 2 (Two) Times a Day.     Dispense:  60 tablet     Refill:  2       Return in about 2 months (around 7/13/2020) for Recheck.         This document has been electronically signed by Yadira Power PA-C  May 13, 2020 12:33

## 2020-05-14 NOTE — PROGRESS NOTES
I have reviewed the notes, assessments, and/or procedures performed byYadira Power, I concur with her/his documentation of Ora Lynn.

## 2020-06-01 ENCOUNTER — TELEPHONE (OUTPATIENT)
Dept: PSYCHIATRY | Facility: CLINIC | Age: 35
End: 2020-06-01

## 2020-06-01 DIAGNOSIS — F41.1 GENERALIZED ANXIETY DISORDER: ICD-10-CM

## 2020-06-01 DIAGNOSIS — F33.1 MAJOR DEPRESSIVE DISORDER, RECURRENT EPISODE, MODERATE (HCC): Primary | ICD-10-CM

## 2020-06-01 NOTE — TELEPHONE ENCOUNTER
6/1/20-- pt called wanting [employer] results-- Printed results- placed on  Your desk  For review today/jacy-- pt call back # [phone]/jacy

## 2020-06-02 RX ORDER — FLUOXETINE HYDROCHLORIDE 20 MG/1
20 CAPSULE ORAL EVERY MORNING
Qty: 90 CAPSULE | Refills: 0 | Status: SHIPPED | OUTPATIENT
Start: 2020-06-02 | End: 2020-07-13 | Stop reason: ALTCHOICE

## 2020-06-02 NOTE — TELEPHONE ENCOUNTER
Called patient to discuss [employer] results.  Informed her that Zoloft and Lexapro which she has taken in the past both have moderate Gene to drug interactions.  Advised that we try Prozac 20 mg daily for her depression and anxiety, which is use as directed.  She reports doing well with BuSpar 10 mg tablets, but she is breaking them in half.  Follow-up on July 13 as scheduled and will increase the Prozac if needed.

## 2020-07-06 ENCOUNTER — E-VISIT (OUTPATIENT)
Dept: FAMILY MEDICINE CLINIC | Facility: TELEHEALTH | Age: 35
End: 2020-07-06

## 2020-07-06 DIAGNOSIS — N89.8 VAGINAL DISCHARGE: ICD-10-CM

## 2020-07-06 DIAGNOSIS — R39.89 SUSPECTED UTI: Primary | ICD-10-CM

## 2020-07-06 PROCEDURE — 99421 OL DIG E/M SVC 5-10 MIN: CPT | Performed by: NURSE PRACTITIONER

## 2020-07-06 RX ORDER — FLUCONAZOLE 150 MG/1
150 TABLET ORAL ONCE
Qty: 1 TABLET | Refills: 0 | Status: SHIPPED | OUTPATIENT
Start: 2020-07-06 | End: 2020-07-06

## 2020-07-06 RX ORDER — PHENAZOPYRIDINE HYDROCHLORIDE 200 MG/1
200 TABLET, FILM COATED ORAL 3 TIMES DAILY PRN
Qty: 6 TABLET | Refills: 0 | Status: SHIPPED | OUTPATIENT
Start: 2020-07-06 | End: 2020-07-08

## 2020-07-06 RX ORDER — NITROFURANTOIN 25; 75 MG/1; MG/1
100 CAPSULE ORAL 2 TIMES DAILY
Qty: 10 CAPSULE | Refills: 0 | Status: SHIPPED | OUTPATIENT
Start: 2020-07-06 | End: 2020-07-11

## 2020-07-06 NOTE — PATIENT INSTRUCTIONS
Wipe front to back, stay hydrated with water to flush the kidneys, avoid caffeinated beverages.  If symptoms worsen or do not improve follow up with your PCP or Gynecology.     Urinary Tract Infection, Adult  A urinary tract infection (UTI) is an infection of any part of the urinary tract. The urinary tract includes:  · The kidneys.  · The ureters.  · The bladder.  · The urethra.  These organs make, store, and get rid of pee (urine) in the body.  What are the causes?  This is caused by germs (bacteria) in your genital area. These germs grow and cause swelling (inflammation) of your urinary tract.  What increases the risk?  You are more likely to develop this condition if:  · You have a small, thin tube (catheter) to drain pee.  · You cannot control when you pee or poop (incontinence).  · You are female, and:  ? You use these methods to prevent pregnancy:  ? A medicine that kills sperm (spermicide).  ? A device that blocks sperm (diaphragm).  ? You have low levels of a female hormone (estrogen).  ? You are pregnant.  · You have genes that add to your risk.  · You are sexually active.  · You take antibiotic medicines.  · You have trouble peeing because of:  ? A prostate that is bigger than normal, if you are male.  ? A blockage in the part of your body that drains pee from the bladder (urethra).  ? A kidney stone.  ? A nerve condition that affects your bladder (neurogenic bladder).  ? Not getting enough to drink.  ? Not peeing often enough.  · You have other conditions, such as:  ? Diabetes.  ? A weak disease-fighting system (immune system).  ? Sickle cell disease.  ? Gout.  ? Injury of the spine.  What are the signs or symptoms?  Symptoms of this condition include:  · Needing to pee right away (urgently).  · Peeing often.  · Peeing small amounts often.  · Pain or burning when peeing.  · Blood in the pee.  · Pee that smells bad or not like normal.  · Trouble peeing.  · Pee that is cloudy.  · Fluid coming from the  vagina, if you are female.  · Pain in the belly or lower back.  Other symptoms include:  · Throwing up (vomiting).  · No urge to eat.  · Feeling mixed up (confused).  · Being tired and grouchy (irritable).  · A fever.  · Watery poop (diarrhea).  How is this treated?  This condition may be treated with:  · Antibiotic medicine.  · Other medicines.  · Drinking enough water.  Follow these instructions at home:    Medicines  · Take over-the-counter and prescription medicines only as told by your doctor.  · If you were prescribed an antibiotic medicine, take it as told by your doctor. Do not stop taking it even if you start to feel better.  General instructions  · Make sure you:  ? Pee until your bladder is empty.  ? Do not hold pee for a long time.  ? Empty your bladder after sex.  ? Wipe from front to back after pooping if you are a female. Use each tissue one time when you wipe.  · Drink enough fluid to keep your pee pale yellow.  · Keep all follow-up visits as told by your doctor. This is important.  Contact a doctor if:  · You do not get better after 1-2 days.  · Your symptoms go away and then come back.  Get help right away if:  · You have very bad back pain.  · You have very bad pain in your lower belly.  · You have a fever.  · You are sick to your stomach (nauseous).  · You are throwing up.  Summary  · A urinary tract infection (UTI) is an infection of any part of the urinary tract.  · This condition is caused by germs in your genital area.  · There are many risk factors for a UTI. These include having a small, thin tube to drain pee and not being able to control when you pee or poop.  · Treatment includes antibiotic medicines for germs.  · Drink enough fluid to keep your pee pale yellow.  This information is not intended to replace advice given to you by your health care provider. Make sure you discuss any questions you have with your health care provider.  Document Released: 06/05/2009 Document Revised:  12/05/2019 Document Reviewed: 06/27/2019  [employer] Patient Education © 2020 [employer]  Vaginal Yeast infection, Adult    Vaginal yeast infection is a condition that causes vaginal discharge as well as soreness, swelling, and redness (inflammation) of the vagina. This is a common condition. Some women get this infection frequently.  What are the causes?  This condition is caused by a change in the normal balance of the yeast (candida) and bacteria that live in the vagina. This change causes an overgrowth of yeast, which causes the inflammation.  What increases the risk?  The condition is more likely to develop in women who:  · Take antibiotic medicines.  · Have diabetes.  · Take birth control pills.  · Are pregnant.  · Douche often.  · Have a weak body defense system (immune system).  · Have been taking steroid medicines for a long time.  · Frequently wear tight clothing.  What are the signs or symptoms?  Symptoms of this condition include:  · White, thick, creamy vaginal discharge.  · Swelling, itching, redness, and irritation of the vagina. The lips of the vagina (vulva) may be affected as well.  · Pain or a burning feeling while urinating.  · Pain during sex.  How is this diagnosed?  This condition is diagnosed based on:  · Your medical history.  · A physical exam.  · A pelvic exam. Your health care provider will examine a sample of your vaginal discharge under a microscope. Your health care provider may send this sample for testing to confirm the diagnosis.  How is this treated?  This condition is treated with medicine. Medicines may be over-the-counter or prescription. You may be told to use one or more of the following:  · Medicine that is taken by mouth (orally).  · Medicine that is applied as a cream (topically).  · Medicine that is inserted directly into the vagina (suppository).  Follow these instructions at home:    Lifestyle  · Do not have sex until your health care provider approves. Tell your sex  partner that you have a yeast infection. That person should go to his or her health care provider and ask if they should also be treated.  · Do not wear tight clothes, such as pantyhose or tight pants.  · Wear breathable cotton underwear.  General instructions  · Take or apply over-the-counter and prescription medicines only as told by your health care provider.  · Eat more yogurt. This may help to keep your yeast infection from returning.  · Do not use tampons until your health care provider approves.  · Try taking a sitz bath to help with discomfort. This is a warm water bath that is taken while you are sitting down. The water should only come up to your hips and should cover your buttocks. Do this 3-4 times per day or as told by your health care provider.  · Do not douche.  · If you have diabetes, keep your blood sugar levels under control.  · Keep all follow-up visits as told by your health care provider. This is important.  Contact a health care provider if:  · You have a fever.  · Your symptoms go away and then return.  · Your symptoms do not get better with treatment.  · Your symptoms get worse.  · You have new symptoms.  · You develop blisters in or around your vagina.  · You have blood coming from your vagina and it is not your menstrual period.  · You develop pain in your abdomen.  Summary  · Vaginal yeast infection is a condition that causes discharge as well as soreness, swelling, and redness (inflammation) of the vagina.  · This condition is treated with medicine. Medicines may be over-the-counter or prescription.  · Take or apply over-the-counter and prescription medicines only as told by your health care provider.  · Do not douche. Do not have sex or use tampons until your health care provider approves.  · Contact a health care provider if your symptoms do not get better with treatment or your symptoms go away and then return.  This information is not intended to replace advice given to you by your  health care provider. Make sure you discuss any questions you have with your health care provider.  Document Released: 09/27/2006 Document Revised: 05/06/2019 Document Reviewed: 05/06/2019  [employer] Patient Education © 2020 [employer]

## 2020-07-06 NOTE — PROGRESS NOTES
Ora Lynn    1985  [id number]    I have reviewed the e-Visit questionnaire and patient's answers, my assessment and plan are as follows:    HPI- Pt reports dysuria, urinary frequency and urgency, vaginal discharge that is thick and white with no smell x 1-4 days. She has had similar symptoms in the past and was treated with pills for UTI, cranberry juice and pills for yeast infection    Review of Systems - General ROS: negative for - fever  Genito-Urinary ROS: positive for - dysuria, genital discharge and urinary frequency/urgency  negative for - genital ulcers      Diagnoses and all orders for this visit:    Suspected UTI  -     nitrofurantoin, macrocrystal-monohydrate, (Macrobid) 100 MG capsule; Take 1 capsule by mouth 2 (Two) Times a Day for 5 days.  -     phenazopyridine (Pyridium) 200 MG tablet; Take 1 tablet by mouth 3 (Three) Times a Day As Needed for Bladder Spasms for up to 2 days.    Vaginal discharge  -     fluconazole (Diflucan) 150 MG tablet; Take 1 tablet by mouth 1 (One) Time for 1 dose. Take after completing antibiotic    Wipe front to back, stay hydrated with water to flush the kidneys, avoid caffeinated beverages.  If symptoms worsen or do not improve follow up with your PCP or Gynecology.     Any medications prescribed have been sent electronically to   [facility] [address] [place] [address], [place] - [address] AT  - [phone] PH - [phone] FX  [address]  [place] [address]  Phone: [phone] Fax: [phone]    I spent 10 minutes reviewing this chart.       Tanisha Horner, APRVERONIKA  07/06/20  2:53 PM

## 2020-07-13 ENCOUNTER — TELEMEDICINE (OUTPATIENT)
Dept: PSYCHIATRY | Facility: CLINIC | Age: 35
End: 2020-07-13

## 2020-07-13 DIAGNOSIS — F41.1 GENERALIZED ANXIETY DISORDER: ICD-10-CM

## 2020-07-13 DIAGNOSIS — F33.1 MAJOR DEPRESSIVE DISORDER, RECURRENT EPISODE, MODERATE (HCC): Primary | ICD-10-CM

## 2020-07-13 PROBLEM — B00.1 RECURRENT HERPES LABIALIS: Status: ACTIVE | Noted: 2017-09-29

## 2020-07-13 PROBLEM — K29.70 GASTRITIS: Status: ACTIVE | Noted: 2017-09-07

## 2020-07-13 PROBLEM — T78.40XA ALLERGIC REACTION: Status: ACTIVE | Noted: 2017-01-23

## 2020-07-13 PROBLEM — J02.9 ACUTE PHARYNGITIS: Status: ACTIVE | Noted: 2018-04-13

## 2020-07-13 PROBLEM — J32.9 SINUSITIS: Status: ACTIVE | Noted: 2018-04-26

## 2020-07-13 PROBLEM — R03.0 ELEVATED BLOOD PRESSURE READING WITHOUT DIAGNOSIS OF HYPERTENSION: Status: ACTIVE | Noted: 2018-04-13

## 2020-07-13 PROBLEM — K21.9 GASTROESOPHAGEAL REFLUX DISEASE: Status: ACTIVE | Noted: 2017-09-07

## 2020-07-13 PROBLEM — J01.00 SINUSITIS, ACUTE MAXILLARY: Status: ACTIVE | Noted: 2017-01-22

## 2020-07-13 PROCEDURE — 99213 OFFICE O/P EST LOW 20 MIN: CPT | Performed by: PHYSICIAN ASSISTANT

## 2020-07-13 RX ORDER — VENLAFAXINE HYDROCHLORIDE 37.5 MG/1
37.5 CAPSULE, EXTENDED RELEASE ORAL DAILY
Qty: 30 CAPSULE | Refills: 2 | Status: SHIPPED | OUTPATIENT
Start: 2020-07-13 | End: 2020-09-04

## 2020-07-13 RX ORDER — BUSPIRONE HYDROCHLORIDE 15 MG/1
15 TABLET ORAL 3 TIMES DAILY
Qty: 90 TABLET | Refills: 2 | Status: SHIPPED | OUTPATIENT
Start: 2020-07-13 | End: 2020-09-04

## 2020-07-13 NOTE — PROGRESS NOTES
"Subjective   Ora Lynn is a 35 y.o. female who presents today for follow up via video visit x 15 minutes    Chief Complaint:  Anxiety and depression    History of Present Illness:   First day back to work today in 4 months, was on ANAMIKA b/c no sitter for 14 month old son (Ford)  Got pregnant again and had a miscarriage in Sept 2019 No , denied for assistance, only works part-time, lost her full-time job    \"I have had it my whole life but fought it\"  Prozac was use as directed per [facility] but she did not like it, gave her HA and nause  Depression 6/10  Anxiety 6/10, BuSpar helping  Denies SI/HI  Gets anxious around people  Single Mom, her mother is 8 hrs away in [place], [place] and they are close  Always afraid, worries about her mom dying or getting into an accident, or her son getting sick, getting into an accident  Terrified of taking medication  Triggers: raising son alone, her ex (has his own anxiety and drinks too much, makes her more anxious)    The following portions of the patient's history were reviewed and updated as appropriate: allergies, current medications, past family history, past medical history, past social history, past surgical history and problem list.    PAST PSYCHIATRIC HISTORY  Axis I  Affective/Bipoloar Disorder, Anxiety/Panic Disorder  Axis II  None    PAST OUTPATIENT TREATMENT  Diagnosis treated:  Affective Disorder, Anxiety/Panic Disorder  Treatment Type:  Individual Therapy, Medication Management, Supportive Counseling ([facility] briefly)  No hospitalizations  [facility] testing done May 2020  Prior Psychiatric Medications:  Zoloft, really didn't take routinely  Lexapro, did not help  Prozac, HA and nause  Support Groups:  None  Sequelae Of Mental Disorder:  social isolation, family disruption, emotional distress      Interval History  Improved    Side Effects  None    Past Psych Hx was reviewed and compared to 5/13/20 visit and appropriate updates " were made.    Past Medical History:  Past Medical History:   Diagnosis Date   • Depression        Social History:  Social History     Socioeconomic History   • Marital status: Single     Spouse name: Not on file   • Number of children: Not on file   • Years of education: Not on file   • Highest education level: Not on file   Tobacco Use   • Smoking status: Never Smoker   • Smokeless tobacco: Never Used   Substance and Sexual Activity   • Alcohol use: Yes     Alcohol/week: 1.0 standard drinks     Types: 1 Glasses of wine per week     Comment: socially   • Drug use: No   • Sexual activity: Not Currently     Partners: Male       Family History:  Family History   Problem Relation Age of Onset   • Alcohol abuse Father        Past Surgical History:  History reviewed. No pertinent surgical history.    Problem List:  Patient Active Problem List   Diagnosis   • Acute pharyngitis   • Allergic reaction   • Difficult or painful urination   • Elevated blood pressure reading without diagnosis of hypertension   • Fever   • Gastroesophageal reflux disease   • Gastritis   • Left flank pain   • Recurrent herpes labialis   • Sinusitis   • Sinusitis, acute maxillary   • Urinary tract infection       Allergy:   No Known Allergies     Discontinued Medications:  Medications Discontinued During This Encounter   Medication Reason   • FLUoxetine (PROzac) 20 MG capsule Alternate therapy   • busPIRone (BUSPAR) 10 MG tablet Reorder       Current Medications:   Current Outpatient Medications   Medication Sig Dispense Refill   • BLISOVI FE 1/20 1-20 MG-MCG per tablet      • busPIRone (BUSPAR) 15 MG tablet Take 1 tablet by mouth 3 (Three) Times a Day. 90 tablet 2   • venlafaxine XR (Effexor XR) 37.5 MG 24 hr capsule Take 1 capsule by mouth Daily. 30 capsule 2     No current facility-administered medications for this visit.          Review of Symptoms:    Psychiatric/Behavioral: Negative for agitation, behavioral problems, confusion, decreased  concentration, hallucinations, self-injury, sleep disturbance and suicidal ideas. The patient is nervous/anxious with dysphoric mood and is not hyperactive.        Physical Exam:   not currently breastfeeding.    Mental Status Exam:   Hygiene:   good  Cooperation:  Cooperative  Eye Contact:  Good  Psychomotor Behavior:  Appropriate  Affect:  Appropriate  Mood: anxious  Hopelessness: Denies  Speech:  Normal  Thought Process:  Goal directed  Thought Content:  Normal  Suicidal:  None  Homicidal:  None  Hallucinations:  None  Delusion:  None  Memory:  Intact  Orientation:  Person, Place, Time and Situation  Reliability:  good  Insight:  Good  Judgement:  Good  Impulse Control:  Good  Physical/Medical Issues:  No      Mental Status Exam was reviewed and compared to 5/13/20 visit and appropriate updates were made.    PHQ-9 Depression Screening  Little interest or pleasure in doing things? 1   Feeling down, depressed, or hopeless? 1   Trouble falling or staying asleep, or sleeping too much? 1   Feeling tired or having little energy? 2   Poor appetite or overeating? 1   Feeling bad about yourself - or that you are a failure or have let yourself or your family down? 1   Trouble concentrating on things, such as reading the newspaper or watching television? 1   Moving or speaking so slowly that other people could have noticed? Or the opposite - being so fidgety or restless that you have been moving around a lot more than usual? 0   Thoughts that you would be better off dead, or of hurting yourself in some way? 0   PHQ-9 Total Score 8   If you checked off any problems, how difficult have these problems made it for you to do your work, take care of things at home, or get along with other people? Somewhat difficult           Never smoker    I advised Ora of the risks of tobacco use.     Lab Results:   No visits with results within 3 Month(s) from this visit.   Latest known visit with results is:   Admission on 03/09/2020,  Discharged on 03/09/2020   Component Date Value Ref Range Status   • Rapid Influenza A Ag 03/09/2020 Negative  Negative Final   • Rapid Influenza B Ag 03/09/2020 Negative  Negative Final   • Internal Control 03/09/2020 Passed  Passed Final   • Lot Number 03/09/2020 [id number]   Final   • Expiration Date 03/09/2020 11/17/2022   Final       Assessment/Plan   Problems Addressed this Visit     None      Visit Diagnoses     Major depressive disorder, recurrent episode, moderate (CMS/HCC)    -  Primary    Relevant Medications    busPIRone (BUSPAR) 15 MG tablet    venlafaxine XR (Effexor XR) 37.5 MG 24 hr capsule    Generalized anxiety disorder        Relevant Medications    busPIRone (BUSPAR) 15 MG tablet    venlafaxine XR (Effexor XR) 37.5 MG 24 hr capsule          Visit Diagnoses:    ICD-10-CM ICD-9-CM   1. Major depressive disorder, recurrent episode, moderate (CMS/HCC) F33.1 296.32   2. Generalized anxiety disorder F41.1 300.02       TREATMENT PLAN/GOALS: Continue supportive psychotherapy efforts and medications as indicated. Treatment and medication options discussed during today's visit. Patient ackowledged and verbally consented to continue with current treatment plan and was educated on the importance of compliance with treatment and follow-up appointments.    MEDICATION ISSUES:  INSPECT reviewed as expected  Discussed medication options and treatment plan of prescribed medication as well as the risks, benefits, and side effects including potential falls, possible impaired driving and metabolic adversities among others. Patient is agreeable to call the office with any worsening of symptoms or onset of side effects. Patient is agreeable to call 911 or go to the nearest ER should he/she begin having SI/HI. No medication side effects or related complaints today.     Patient has long hx of depression and anxiety but has not had it treated properly, scared of taking meds that are not right for her.  Patient tried Prozac  per [facility] but did not like it on 20mg daily, so will switch to Effexor 37.5mg daily  Increase Buspar to 15mg TID for anxiety     MEDS ORDERED DURING VISIT:  New Medications Ordered This Visit   Medications   • busPIRone (BUSPAR) 15 MG tablet     Sig: Take 1 tablet by mouth 3 (Three) Times a Day.     Dispense:  90 tablet     Refill:  2   • venlafaxine XR (Effexor XR) 37.5 MG 24 hr capsule     Sig: Take 1 capsule by mouth Daily.     Dispense:  30 capsule     Refill:  2       Return in about 2 months (around 9/13/2020).         This document has been electronically signed by Yadira Power PA-C  July 13, 2020 14:58

## 2020-07-15 PROCEDURE — U0003 INFECTIOUS AGENT DETECTION BY NUCLEIC ACID (DNA OR RNA); SEVERE ACUTE RESPIRATORY SYNDROME CORONAVIRUS 2 (SARS-COV-2) (CORONAVIRUS DISEASE [COVID-19]), AMPLIFIED PROBE TECHNIQUE, MAKING USE OF HIGH THROUGHPUT TECHNOLOGIES AS DESCRIBED BY CMS-2020-01-R: HCPCS

## 2020-09-04 ENCOUNTER — OFFICE VISIT (OUTPATIENT)
Dept: ENDOCRINOLOGY | Facility: CLINIC | Age: 35
End: 2020-09-04

## 2020-09-04 VITALS
OXYGEN SATURATION: 99 % | DIASTOLIC BLOOD PRESSURE: 80 MMHG | TEMPERATURE: 98 F | HEART RATE: 115 BPM | WEIGHT: 104 LBS | HEIGHT: 62 IN | BODY MASS INDEX: 19.14 KG/M2 | SYSTOLIC BLOOD PRESSURE: 136 MMHG

## 2020-09-04 DIAGNOSIS — E05.90 HYPERTHYROIDISM: Primary | ICD-10-CM

## 2020-09-04 DIAGNOSIS — Z51.81 ENCOUNTER FOR MEDICATION MONITORING: ICD-10-CM

## 2020-09-04 DIAGNOSIS — Z3A.11 11 WEEKS GESTATION OF PREGNANCY: ICD-10-CM

## 2020-09-04 PROCEDURE — 99203 OFFICE O/P NEW LOW 30 MIN: CPT | Performed by: INTERNAL MEDICINE

## 2020-09-04 RX ORDER — PRENATAL VIT/IRON FUM/FOLIC AC 27MG-0.8MG
TABLET ORAL DAILY
COMMUNITY
End: 2021-05-03

## 2020-09-04 RX ORDER — METHIMAZOLE 10 MG/1
TABLET ORAL
Qty: 60 TABLET | Refills: 1 | Status: SHIPPED | OUTPATIENT
Start: 2020-09-04 | End: 2020-10-25

## 2020-09-04 RX ORDER — PROPRANOLOL HYDROCHLORIDE 10 MG/1
TABLET ORAL
Qty: 180 TABLET | Refills: 1 | Status: SHIPPED | OUTPATIENT
Start: 2020-09-04 | End: 2020-09-19

## 2020-09-04 NOTE — PROGRESS NOTES
[facility]    Referring Provider: No ref. provider found  Reason for Consultation: Gestational Diabetes evaluation & management.    Patient Care Team:  Edna Francois APRN as PCP - General (Family Medicine)  Afua Moe MD as Consulting Physician (Obstetrics and Gynecology)    Chief complaint Thyroid Problem (11 weeks pregnant)      Subjective .     History of present illness:    This is a  35 y.o. female currently @ 11 weeks gestation. Due date 3/25/2021.  This is her 3rd pregnancy. Unplanned. LMP May 2020  Previous gestational diabetes: No  Seen for 1st pre  exam on 2020. TFT's consistent with thyrotoxicosis. So she was sent for further w/u.  C/o palpitations, anxiety & wt loss since April.  Noted enlarged thyroid as well.  Keeps losing wt, but has polyphagia.  Menarche @ age 13y. Menses regular. Placed on BCP in October, but made her sick, so she stopped in March.  [id number] baby wt 6 lb @ 35 weeks gestation. Had . Then had a miscarriage in 2019.      Review of Systems  Review of Systems   Constitutional: Positive for unexpected weight change.   HENT: Negative for trouble swallowing.    Eyes: Negative for visual disturbance.   Respiratory: Positive for shortness of breath.         Snoring   Cardiovascular: Positive for palpitations.   Gastrointestinal: Negative for constipation and nausea.   Endocrine: Positive for polydipsia and polyphagia. Negative for polyuria.   Genitourinary: Negative for dysuria.   Neurological: Positive for dizziness, tremors and weakness. Negative for numbness.   Psychiatric/Behavioral: The patient is nervous/anxious.         Irritability     So far has lost 20 lb this pregnancy, as she is trying to gain.    History  Past Medical History:   Diagnosis Date   • Depression    • Thyroid disease      Past Surgical History:   Procedure Laterality Date   •  SECTION  2019     Family History   Problem Relation Age of  Onset   • Alcohol abuse Father    • Diabetes Paternal Aunt      Social History     Tobacco Use   • Smoking status: Never Smoker   • Smokeless tobacco: Never Used   Substance Use Topics   • Alcohol use: Yes     Alcohol/week: 1.0 standard drinks     Types: 1 Glasses of wine per week     Comment: socially   • Drug use: No         Current Outpatient Medications on File Prior to Visit   Medication Sig Dispense Refill   • prenatal vitamin (prenatal, CLASSIC, vitamin) tablet Take  by mouth Daily.     • [DISCONTINUED] azithromycin (ZITHROMAX) 250 MG tablet Take 2 tablets the first day, then 1 tablet daily for 4 days. 6 tablet 0   • [DISCONTINUED] busPIRone (BUSPAR) 15 MG tablet Take 1 tablet by mouth 3 (Three) Times a Day. 90 tablet 2   • [DISCONTINUED] venlafaxine XR (Effexor XR) 37.5 MG 24 hr capsule Take 1 capsule by mouth Daily. 30 capsule 2     No current facility-administered medications on file prior to visit.        Allergies:  Patient has no known allergies.    Objective     Vital Signs   Vitals:    09/04/20 1103   BP: 136/80   Pulse: 115   Temp: 98 °F (36.7 °C)   SpO2: 99%       Physical Exam:     General:    Alert, cooperative, in no acute distress. Thin   Head:    Normocephalic, without obvious abnormality, atraumatic   Eyes:            No lid lag, conjunctivae and sclerae normal, PERRLA   Throat:   No oral lesions,  oral mucosa moist   Neck:   33 cm in circumference, thyroid x2 enlarged, soft, no   carotid bruit   Lungs:     Clear    Abdomen:     Normal bowel sounds, soft                 Extremities:   Moves all extremities well, no edema. Fine hand tremors               Pulses:   Pulses palpable and equal bilaterally   Skin:   Dry. No bruising or rash   Neurologic:  DTR brisk       Results Review  I have reviewed the patient's new clinical results, labs & imaging.    TSH < 0.01, Free T4 4.0, Total T3 701 on 9/2/2020      Assessment/Plan     1. Hyperthyroidism  2. IUP, 11 weeks    Get labs done in 2  weeks.  Will call you with the lab results.  Start propranolol 10 mg one tab in am & @ supper.  Double dose if needed for heart palpitations.  Start methimazole 10 mg one tab in am & one @ supper.  Info given on Hyperthyroidism & thyroid in pregnancy.    I discussed the patients findings and my recommendations with patient.    Ismael Monsivais MD  09/04/20  17:38

## 2020-09-04 NOTE — PATIENT INSTRUCTIONS
Get labs done in 2 weeks.  Will call you with the lab results.  Start propranolol 10 mg one tab in am & @ supper.  Double dose if needed for heart palpitations.  Start methimazole 10 mg one tab in am & one @ supper.  F/u in 3-4 months, with labs prior.

## 2020-09-19 ENCOUNTER — TELEMEDICINE (OUTPATIENT)
Dept: FAMILY MEDICINE CLINIC | Facility: TELEHEALTH | Age: 35
End: 2020-09-19

## 2020-09-19 ENCOUNTER — E-VISIT (OUTPATIENT)
Dept: FAMILY MEDICINE CLINIC | Facility: TELEHEALTH | Age: 35
End: 2020-09-19

## 2020-09-19 DIAGNOSIS — B00.1 HERPES LABIALIS: Primary | ICD-10-CM

## 2020-09-19 PROCEDURE — 99213 OFFICE O/P EST LOW 20 MIN: CPT | Performed by: NURSE PRACTITIONER

## 2020-09-19 RX ORDER — ACYCLOVIR 400 MG/1
400 TABLET ORAL
Qty: 25 TABLET | Refills: 0 | Status: SHIPPED | OUTPATIENT
Start: 2020-09-19 | End: 2020-09-24

## 2020-09-19 RX ORDER — LABETALOL 100 MG/1
100 TABLET, FILM COATED ORAL 2 TIMES DAILY
Status: ON HOLD | COMMUNITY
Start: 2020-09-09 | End: 2020-11-08

## 2020-09-22 ENCOUNTER — LAB (OUTPATIENT)
Dept: LAB | Facility: HOSPITAL | Age: 35
End: 2020-09-22

## 2020-09-22 ENCOUNTER — OFFICE VISIT (OUTPATIENT)
Dept: PSYCHIATRY | Facility: CLINIC | Age: 35
End: 2020-09-22

## 2020-09-22 DIAGNOSIS — F41.1 GENERALIZED ANXIETY DISORDER: ICD-10-CM

## 2020-09-22 DIAGNOSIS — Z3A.11 11 WEEKS GESTATION OF PREGNANCY: ICD-10-CM

## 2020-09-22 DIAGNOSIS — Z51.81 ENCOUNTER FOR MEDICATION MONITORING: ICD-10-CM

## 2020-09-22 DIAGNOSIS — E05.90 HYPERTHYROIDISM: ICD-10-CM

## 2020-09-22 DIAGNOSIS — F33.1 MAJOR DEPRESSIVE DISORDER, RECURRENT EPISODE, MODERATE (HCC): Primary | ICD-10-CM

## 2020-09-22 LAB
ALBUMIN SERPL-MCNC: 4 G/DL (ref 3.5–5.2)
ALBUMIN/GLOB SERPL: 1.3 G/DL
ALP SERPL-CCNC: 73 U/L (ref 39–117)
ALT SERPL W P-5'-P-CCNC: 16 U/L (ref 1–33)
ANION GAP SERPL CALCULATED.3IONS-SCNC: 8 MMOL/L (ref 5–15)
AST SERPL-CCNC: 17 U/L (ref 1–32)
BASOPHILS # BLD AUTO: 0.02 10*3/MM3 (ref 0–0.2)
BASOPHILS NFR BLD AUTO: 0.3 % (ref 0–1.5)
BILIRUB SERPL-MCNC: 0.2 MG/DL (ref 0–1.2)
BUN SERPL-MCNC: 8 MG/DL (ref 6–20)
BUN/CREAT SERPL: 25 (ref 7–25)
CALCIUM SPEC-SCNC: 9.5 MG/DL (ref 8.6–10.5)
CHLORIDE SERPL-SCNC: 104 MMOL/L (ref 98–107)
CO2 SERPL-SCNC: 23 MMOL/L (ref 22–29)
CREAT SERPL-MCNC: 0.32 MG/DL (ref 0.57–1)
DEPRECATED RDW RBC AUTO: 37.9 FL (ref 37–54)
EOSINOPHIL # BLD AUTO: 0.04 10*3/MM3 (ref 0–0.4)
EOSINOPHIL NFR BLD AUTO: 0.5 % (ref 0.3–6.2)
ERYTHROCYTE [DISTWIDTH] IN BLOOD BY AUTOMATED COUNT: 13.1 % (ref 12.3–15.4)
FREE T4, MATERNAL: 1.81 NG/DL
GFR SERPL CREATININE-BSD FRML MDRD: >150 ML/MIN/1.73
GLOBULIN UR ELPH-MCNC: 3.1 GM/DL
GLUCOSE SERPL-MCNC: 82 MG/DL (ref 65–99)
HCT VFR BLD AUTO: 35.1 % (ref 34–46.6)
HGB BLD-MCNC: 11.7 G/DL (ref 12–15.9)
IMM GRANULOCYTES # BLD AUTO: 0.02 10*3/MM3 (ref 0–0.05)
IMM GRANULOCYTES NFR BLD AUTO: 0.3 % (ref 0–0.5)
LYMPHOCYTES # BLD AUTO: 2.05 10*3/MM3 (ref 0.7–3.1)
LYMPHOCYTES NFR BLD AUTO: 26 % (ref 19.6–45.3)
MCH RBC QN AUTO: 26.8 PG (ref 26.6–33)
MCHC RBC AUTO-ENTMCNC: 33.3 G/DL (ref 31.5–35.7)
MCV RBC AUTO: 80.3 FL (ref 79–97)
MONOCYTES # BLD AUTO: 0.57 10*3/MM3 (ref 0.1–0.9)
MONOCYTES NFR BLD AUTO: 7.2 % (ref 5–12)
NEUTROPHILS NFR BLD AUTO: 5.19 10*3/MM3 (ref 1.7–7)
NEUTROPHILS NFR BLD AUTO: 65.7 % (ref 42.7–76)
NRBC BLD AUTO-RTO: 0 /100 WBC (ref 0–0.2)
PLATELET # BLD AUTO: 303 10*3/MM3 (ref 140–450)
PMV BLD AUTO: 10.1 FL (ref 6–12)
POTASSIUM SERPL-SCNC: 4.1 MMOL/L (ref 3.5–5.2)
PROT SERPL-MCNC: 7.1 G/DL (ref 6–8.5)
RBC # BLD AUTO: 4.37 10*6/MM3 (ref 3.77–5.28)
SODIUM SERPL-SCNC: 135 MMOL/L (ref 136–145)
TSH SERPL DL<=0.05 MIU/L-ACNC: <0.005 UIU/ML (ref 0.27–4.2)
WBC # BLD AUTO: 7.89 10*3/MM3 (ref 3.4–10.8)

## 2020-09-22 PROCEDURE — 85025 COMPLETE CBC W/AUTO DIFF WBC: CPT

## 2020-09-22 PROCEDURE — 80053 COMPREHEN METABOLIC PANEL: CPT

## 2020-09-22 PROCEDURE — 84439 ASSAY OF FREE THYROXINE: CPT

## 2020-09-22 PROCEDURE — 86376 MICROSOMAL ANTIBODY EACH: CPT | Performed by: INTERNAL MEDICINE

## 2020-09-22 PROCEDURE — 84443 ASSAY THYROID STIM HORMONE: CPT

## 2020-09-22 PROCEDURE — 99213 OFFICE O/P EST LOW 20 MIN: CPT | Performed by: PHYSICIAN ASSISTANT

## 2020-09-22 PROCEDURE — 36415 COLL VENOUS BLD VENIPUNCTURE: CPT | Performed by: INTERNAL MEDICINE

## 2020-09-22 NOTE — PROGRESS NOTES
"Subjective   Ora Lynn is a 35 y.o. female who presents today for follow up in the office    Chief Complaint:  Anxiety and depression    History of Present Illness:   She found out that she is pregnant, 14 wks, EDC 3/25/20, Dr. Afua Moe, been twice, she  is very worried about the depression with pregnancy hormones, \"I was crazy last time and I will have two kids now and can't be that way\"  Dx with hyperthryoidism, seeing Dr. 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  She had been doing well on Effexor but stopped it when she found out about baby  Prozac was use as directed per [facility] but she did not like it, gave her HA and nausea  Depression 5/10, fluctuates  Anxiety 4/10, BuSpar was helping but stopped it  Denies SI/HI  Gets anxious around people  She is going to move into her own apartment  Always afraid, worries about her mom dying or getting into an accident, or her son getting sick, getting into an accident  Triggers: raising son alone, her ex (has his own anxiety and drinks too much, makes her more anxious)    The following portions of the patient's history were reviewed and updated as appropriate: allergies, current medications, past family history, past medical history, past social history, past surgical history and problem list.    PAST PSYCHIATRIC HISTORY  Axis I  Affective/Bipoloar Disorder, Anxiety/Panic Disorder  Axis II  None    PAST OUTPATIENT TREATMENT  Diagnosis treated:  Affective Disorder, Anxiety/Panic Disorder  Treatment Type:  Individual Therapy, Medication Management, Supportive Counseling ([facility] briefly)  No hospitalizations  [facility]ight testing done May 2020  Prior Psychiatric Medications:  Zoloft, really didn't take routinely  Lexapro, did not help  Prozac, HA and nausea  Buspar  Effexor  Support Groups:  None  Sequelae Of Mental Disorder:  social isolation, family " disruption, emotional distress      Interval History  Improved    Side Effects  None    Past Psych Hx was reviewed and compared to 20 visit and appropriate updates were made.    Past Medical History:  Past Medical History:   Diagnosis Date   • Depression    • Thyroid disease        Social History:  Social History     Socioeconomic History   • Marital status: Single     Spouse name: Not on file   • Number of children: Not on file   • Years of education: Not on file   • Highest education level: Not on file   Tobacco Use   • Smoking status: Never Smoker   • Smokeless tobacco: Never Used   Substance and Sexual Activity   • Alcohol use: Not Currently     Alcohol/week: 1.0 standard drinks     Types: 1 Glasses of wine per week   • Drug use: Defer   • Sexual activity: Not Currently     Partners: Male       Family History:  Family History   Problem Relation Age of Onset   • Alcohol abuse Father    • Diabetes Paternal Aunt        Past Surgical History:  Past Surgical History:   Procedure Laterality Date   •  SECTION  2019       Problem List:  Patient Active Problem List   Diagnosis   • Acute pharyngitis   • Allergic reaction   • Difficult or painful urination   • Elevated blood pressure reading without diagnosis of hypertension   • Fever   • Gastroesophageal reflux disease   • Gastritis   • Left flank pain   • Recurrent herpes labialis   • Sinusitis   • Sinusitis, acute maxillary   • Urinary tract infection   • Hyperthyroidism   • 11 weeks gestation of pregnancy       Allergy:   No Known Allergies     Discontinued Medications:  There are no discontinued medications.    Current Medications:   Current Outpatient Medications   Medication Sig Dispense Refill   • acyclovir (ZOVIRAX) 400 MG tablet Take 1 tablet by mouth 5 (Five) Times a Day for 5 days. Take no more than 5 doses a day. 25 tablet 0   • labetalol (NORMODYNE) 100 MG tablet Take 100 mg by mouth 2 (Two) Times a Day.     • methIMAzole (TAPAZOLE) 10 MG  tablet Take one tab in am & ac supper. 60 tablet 1   • prenatal vitamin (prenatal, CLASSIC, vitamin) tablet Take  by mouth Daily.       No current facility-administered medications for this visit.          Review of Symptoms:    Psychiatric/Behavioral: Negative for agitation, behavioral problems, confusion, decreased concentration, hallucinations, self-injury, sleep disturbance and suicidal ideas. The patient is nervous/anxious with dysphoric mood and is not hyperactive.        Physical Exam:   Last menstrual period 06/15/2020, not currently breastfeeding.    Mental Status Exam:   Hygiene:   good  Cooperation:  Cooperative  Eye Contact:  Good  Psychomotor Behavior:  Appropriate  Affect:  Appropriate  Mood: Normal  Hopelessness: Denies  Speech:  Normal  Thought Process:  Goal directed  Thought Content:  Normal  Suicidal:  None  Homicidal:  None  Hallucinations:  None  Delusion:  None  Memory:  Intact  Orientation:  Person, Place, Time and Situation  Reliability:  good  Insight:  Good  Judgement:  Good  Impulse Control:  Good  Physical/Medical Issues:  No      Mental Status Exam was reviewed and compared to 7/13/20 visit and appropriate updates were made.    PHQ-9 Depression Screening  Little interest or pleasure in doing things? 1   Feeling down, depressed, or hopeless? 1   Trouble falling or staying asleep, or sleeping too much? 1   Feeling tired or having little energy? 2   Poor appetite or overeating? 1   Feeling bad about yourself - or that you are a failure or have let yourself or your family down? 1   Trouble concentrating on things, such as reading the newspaper or watching television? 1   Moving or speaking so slowly that other people could have noticed? Or the opposite - being so fidgety or restless that you have been moving around a lot more than usual? 0   Thoughts that you would be better off dead, or of hurting yourself in some way? 0   PHQ-9 Total Score 8   If you checked off any problems, how difficult  have these problems made it for you to do your work, take care of things at home, or get along with other people? Somewhat difficult           Never smoker    I advised Ora of the risks of tobacco use.     Lab Results:   Admission on 07/15/2020, Discharged on 07/15/2020   Component Date Value Ref Range Status   • Rapid Strep A Screen 07/15/2020 Negative  Negative, VALID, INVALID, Not Performed Final   • Internal Control 07/15/2020 Passed  Passed Final   • Lot Number 07/15/2020 [id number]   Final   • Expiration Date 07/15/2020 [id number]   Final   • SARS-CoV-2, LORETTA 07/15/2020 Not Detected  Not Detected Final    This test was developed and its performance characteristics determined  by [facility]. This test has not been FDA cleared or  approved. This test has been authorized by FDA under an Emergency Use  Authorization (EUA). This test is only authorized for the duration of  time the declaration that circumstances exist justifying the  authorization of the emergency use of in vitro diagnostic tests for  detection of SARS-CoV-2 virus and/or diagnosis of COVID-19 infection  under section 564(b)(1) of the Act, 21 U.S.C. 360bbb-3(b)(1), unless  the authorization is terminated or revoked sooner.  When diagnostic testing is negative, the possibility of a false  negative result should be considered in the context of a patient's  recent exposures and the presence of clinical signs and symptoms  consistent with COVID-19. An individual without symptoms of COVID-19  and who is not shedding SARS-CoV-2 virus would expect to have a  negative (not detected) result in this assay.   • COVID [facility] PRIORITY 07/15/2020 Comment   Final    Received       Assessment/Plan   Problems Addressed this Visit     None      Visit Diagnoses     Major depressive disorder, recurrent episode, moderate (CMS/HCC)    -  Primary    Generalized anxiety disorder              Visit Diagnoses:    ICD-10-CM ICD-9-CM   1. Major depressive disorder,  recurrent episode, moderate (CMS/HCC)  F33.1 296.32   2. Generalized anxiety disorder  F41.1 300.02       TREATMENT PLAN/GOALS: Continue supportive psychotherapy efforts and medications as indicated. Treatment and medication options discussed during today's visit. Patient ackowledged and verbally consented to continue with current treatment plan and was educated on the importance of compliance with treatment and follow-up appointments.    MEDICATION ISSUES:  INSPECT reviewed as expected  Discussed medication options and treatment plan of prescribed medication as well as the risks, benefits, and side effects including potential falls, possible impaired driving and metabolic adversities among others. Patient is agreeable to call the office with any worsening of symptoms or onset of side effects. Patient is agreeable to call 911 or go to the nearest ER should he/she begin having SI/HI. No medication side effects or related complaints today.     Patient's [facility] report was reviewed with her in person and she was given a copy to take home.  She sees Dr. Moe in a couple of weeks for follow-up, will ask her if she prefers the patient take Prozac versus Effexor during the pregnancy and we will get her restarted on 1 of them.    MEDS ORDERED DURING VISIT:  No orders of the defined types were placed in this encounter.      Return in about 6 months (around 3/22/2021).         This document has been electronically signed by Yadira Power PA-C  September 22, 2020 09:37 EDT

## 2020-09-23 LAB — THYROPEROXIDASE AB SERPL-ACNC: 52 IU/ML (ref 0–34)

## 2020-09-25 ENCOUNTER — TELEPHONE (OUTPATIENT)
Dept: ENDOCRINOLOGY | Facility: CLINIC | Age: 35
End: 2020-09-25

## 2020-09-25 DIAGNOSIS — Z3A.11 11 WEEKS GESTATION OF PREGNANCY: ICD-10-CM

## 2020-09-25 DIAGNOSIS — E05.90 HYPERTHYROIDISM: Primary | ICD-10-CM

## 2020-09-25 DIAGNOSIS — Z51.81 ENCOUNTER FOR MEDICATION MONITORING: ICD-10-CM

## 2020-09-25 NOTE — TELEPHONE ENCOUNTER
Labs are better. Continue same.  Have labs rechecked in 1 month.  Will call her with the lab results.

## 2020-09-25 NOTE — TELEPHONE ENCOUNTER
"Chief Complaint   Patient presents with     Otitis Media     Health Maintenance     UTD     Flu Shot       Initial Temp 97.7  F (36.5  C) (Axillary)  Wt 21 lb 5 oz (9.667 kg) Estimated body mass index is 14.65 kg/(m^2) as calculated from the following:    Height as of 7/21/17: 2' 7.25\" (0.794 m).    Weight as of 7/21/17: 20 lb 5.5 oz (9.228 kg).  Medication Reconciliation: complete     Tamiko Boo CMA (AAMA)      " Pt called and is requesting lab results. Please advise. She said she didn't know if she was going to have to come back and get more etc?

## 2020-10-14 ENCOUNTER — E-VISIT (OUTPATIENT)
Dept: FAMILY MEDICINE CLINIC | Facility: TELEHEALTH | Age: 35
End: 2020-10-14

## 2020-10-21 ENCOUNTER — LAB (OUTPATIENT)
Dept: LAB | Facility: HOSPITAL | Age: 35
End: 2020-10-21

## 2020-10-21 DIAGNOSIS — Z51.81 ENCOUNTER FOR MEDICATION MONITORING: ICD-10-CM

## 2020-10-21 DIAGNOSIS — Z3A.11 11 WEEKS GESTATION OF PREGNANCY: ICD-10-CM

## 2020-10-21 DIAGNOSIS — E05.90 HYPERTHYROIDISM: ICD-10-CM

## 2020-10-21 LAB
ALBUMIN SERPL-MCNC: 3.5 G/DL (ref 3.5–5.2)
ALBUMIN/GLOB SERPL: 1.1 G/DL
ALP SERPL-CCNC: 67 U/L (ref 39–117)
ALT SERPL W P-5'-P-CCNC: 13 U/L (ref 1–33)
ANION GAP SERPL CALCULATED.3IONS-SCNC: 9.4 MMOL/L (ref 5–15)
AST SERPL-CCNC: 13 U/L (ref 1–32)
BASOPHILS # BLD AUTO: 0.02 10*3/MM3 (ref 0–0.2)
BASOPHILS NFR BLD AUTO: 0.2 % (ref 0–1.5)
BILIRUB SERPL-MCNC: 0.2 MG/DL (ref 0–1.2)
BUN SERPL-MCNC: 8 MG/DL (ref 6–20)
BUN/CREAT SERPL: 26.7 (ref 7–25)
CALCIUM SPEC-SCNC: 9.2 MG/DL (ref 8.6–10.5)
CHLORIDE SERPL-SCNC: 104 MMOL/L (ref 98–107)
CO2 SERPL-SCNC: 22.6 MMOL/L (ref 22–29)
CREAT SERPL-MCNC: 0.3 MG/DL (ref 0.57–1)
DEPRECATED RDW RBC AUTO: 41.7 FL (ref 37–54)
EOSINOPHIL # BLD AUTO: 0.05 10*3/MM3 (ref 0–0.4)
EOSINOPHIL NFR BLD AUTO: 0.6 % (ref 0.3–6.2)
ERYTHROCYTE [DISTWIDTH] IN BLOOD BY AUTOMATED COUNT: 13.9 % (ref 12.3–15.4)
FREE T4, MATERNAL: 2.25 NG/DL
GFR SERPL CREATININE-BSD FRML MDRD: >150 ML/MIN/1.73
GLOBULIN UR ELPH-MCNC: 3.3 GM/DL
GLUCOSE SERPL-MCNC: 61 MG/DL (ref 65–99)
HCT VFR BLD AUTO: 34.9 % (ref 34–46.6)
HGB BLD-MCNC: 11.7 G/DL (ref 12–15.9)
IMM GRANULOCYTES # BLD AUTO: 0.02 10*3/MM3 (ref 0–0.05)
IMM GRANULOCYTES NFR BLD AUTO: 0.2 % (ref 0–0.5)
LYMPHOCYTES # BLD AUTO: 1.67 10*3/MM3 (ref 0.7–3.1)
LYMPHOCYTES NFR BLD AUTO: 18.8 % (ref 19.6–45.3)
MCH RBC QN AUTO: 27.7 PG (ref 26.6–33)
MCHC RBC AUTO-ENTMCNC: 33.5 G/DL (ref 31.5–35.7)
MCV RBC AUTO: 82.7 FL (ref 79–97)
MONOCYTES # BLD AUTO: 0.59 10*3/MM3 (ref 0.1–0.9)
MONOCYTES NFR BLD AUTO: 6.7 % (ref 5–12)
NEUTROPHILS NFR BLD AUTO: 6.52 10*3/MM3 (ref 1.7–7)
NEUTROPHILS NFR BLD AUTO: 73.5 % (ref 42.7–76)
NRBC BLD AUTO-RTO: 0 /100 WBC (ref 0–0.2)
PLATELET # BLD AUTO: 337 10*3/MM3 (ref 140–450)
PMV BLD AUTO: 9.9 FL (ref 6–12)
POTASSIUM SERPL-SCNC: 4 MMOL/L (ref 3.5–5.2)
PROT SERPL-MCNC: 6.8 G/DL (ref 6–8.5)
RBC # BLD AUTO: 4.22 10*6/MM3 (ref 3.77–5.28)
SODIUM SERPL-SCNC: 136 MMOL/L (ref 136–145)
TSH SERPL DL<=0.05 MIU/L-ACNC: <0.005 UIU/ML (ref 0.27–4.2)
WBC # BLD AUTO: 8.87 10*3/MM3 (ref 3.4–10.8)

## 2020-10-21 PROCEDURE — 80053 COMPREHEN METABOLIC PANEL: CPT

## 2020-10-21 PROCEDURE — 36415 COLL VENOUS BLD VENIPUNCTURE: CPT

## 2020-10-21 PROCEDURE — 85025 COMPLETE CBC W/AUTO DIFF WBC: CPT

## 2020-10-21 PROCEDURE — 84443 ASSAY THYROID STIM HORMONE: CPT

## 2020-10-21 PROCEDURE — 84439 ASSAY OF FREE THYROXINE: CPT

## 2020-10-25 DIAGNOSIS — E05.90 HYPERTHYROIDISM: Primary | ICD-10-CM

## 2020-10-25 DIAGNOSIS — Z3A.11 11 WEEKS GESTATION OF PREGNANCY: ICD-10-CM

## 2020-10-25 RX ORDER — METHIMAZOLE 10 MG/1
TABLET ORAL
Qty: 60 TABLET | Refills: 1
Start: 2020-10-25 | End: 2020-10-27 | Stop reason: SDUPTHER

## 2020-10-27 DIAGNOSIS — E05.90 HYPERTHYROIDISM: ICD-10-CM

## 2020-10-27 RX ORDER — METHIMAZOLE 10 MG/1
TABLET ORAL
Qty: 90 TABLET | Refills: 1 | Status: SHIPPED | OUTPATIENT
Start: 2020-10-27 | End: 2020-11-02

## 2020-10-31 DIAGNOSIS — E05.90 HYPERTHYROIDISM: ICD-10-CM

## 2020-11-02 RX ORDER — METHIMAZOLE 10 MG/1
TABLET ORAL
Qty: 60 TABLET | Refills: 1 | Status: SHIPPED | OUTPATIENT
Start: 2020-11-02 | End: 2020-12-14

## 2020-11-08 ENCOUNTER — APPOINTMENT (OUTPATIENT)
Dept: ULTRASOUND IMAGING | Facility: HOSPITAL | Age: 35
End: 2020-11-08

## 2020-11-08 ENCOUNTER — HOSPITAL ENCOUNTER (EMERGENCY)
Facility: HOSPITAL | Age: 35
Discharge: STILL ADMITTED OR ADMINISTRATIVE | End: 2020-11-08

## 2020-11-08 ENCOUNTER — HOSPITAL ENCOUNTER (OUTPATIENT)
Facility: HOSPITAL | Age: 35
Discharge: HOME | End: 2020-11-08
Attending: OBSTETRICS & GYNECOLOGY | Admitting: OBSTETRICS & GYNECOLOGY

## 2020-11-08 VITALS
SYSTOLIC BLOOD PRESSURE: 113 MMHG | DIASTOLIC BLOOD PRESSURE: 66 MMHG | HEART RATE: 77 BPM | BODY MASS INDEX: 22.06 KG/M2 | HEIGHT: 61 IN | WEIGHT: 116.84 LBS | TEMPERATURE: 98 F | OXYGEN SATURATION: 100 % | RESPIRATION RATE: 18 BRPM

## 2020-11-08 PROBLEM — Z34.90 PREGNANT: Status: ACTIVE | Noted: 2020-11-08

## 2020-11-08 LAB
BACTERIA UR QL AUTO: ABNORMAL /HPF
BILIRUB UR QL STRIP: NEGATIVE
CLARITY UR: CLEAR
COLOR UR: YELLOW
GLUCOSE UR STRIP-MCNC: NEGATIVE MG/DL
HGB UR QL STRIP.AUTO: ABNORMAL
HYALINE CASTS UR QL AUTO: ABNORMAL /LPF
KETONES UR QL STRIP: NEGATIVE
LEUKOCYTE ESTERASE UR QL STRIP.AUTO: NEGATIVE
NITRITE UR QL STRIP: NEGATIVE
PH UR STRIP.AUTO: 6 [PH] (ref 5–8)
PROT UR QL STRIP: NEGATIVE
RBC # UR: ABNORMAL /HPF
REF LAB TEST METHOD: ABNORMAL
SP GR UR STRIP: 1.02 (ref 1–1.03)
SQUAMOUS #/AREA URNS HPF: ABNORMAL /HPF
UROBILINOGEN UR QL STRIP: ABNORMAL
WBC UR QL AUTO: ABNORMAL /HPF

## 2020-11-08 PROCEDURE — 76811 OB US DETAILED SNGL FETUS: CPT

## 2020-11-08 PROCEDURE — 81001 URINALYSIS AUTO W/SCOPE: CPT | Performed by: OBSTETRICS & GYNECOLOGY

## 2020-11-08 PROCEDURE — 87086 URINE CULTURE/COLONY COUNT: CPT | Performed by: OBSTETRICS & GYNECOLOGY

## 2020-11-08 PROCEDURE — G0463 HOSPITAL OUTPT CLINIC VISIT: HCPCS

## 2020-11-08 RX ORDER — ACETAMINOPHEN 325 MG/1
650 TABLET ORAL EVERY 4 HOURS PRN
Status: DISCONTINUED | OUTPATIENT
Start: 2020-11-08 | End: 2020-11-09 | Stop reason: HOSPADM

## 2020-11-08 RX ADMIN — ACETAMINOPHEN 650 MG: 325 TABLET, FILM COATED ORAL at 21:24

## 2020-11-09 LAB — BACTERIA SPEC AEROBE CULT: NORMAL

## 2020-11-09 NOTE — SIGNIFICANT NOTE
Dr. Vance notified of  at  that presented to L&D for c/o lower abdominal pain that radiates to her back. Pt reports a c/s delivery 3/2019 and an 8wk miscarriage 2019. MD informed of fetal heart tones 140s and SVE (cl/thick/post) with pink discharge noted on exam glove. VS WNL. Order for clean catch U/A and 20wk U/S noted. May d/c u/s monitors.

## 2020-11-09 NOTE — DISCHARGE SUMMARY
Date of Discharge:  2020    Presenting Problem/History of Present Illness:  Active Hospital Problems    Diagnosis  POA   • Pregnant [Z34.90]  Not Applicable      Resolved Hospital Problems   No resolved problems to display.      Intrauterine pregnancy 20 weeks 4 days low back pain.    Discharge Diagnosis: Same, not delivered.    Procedures Performed:       Monitoring, observation, p.o. hydration.  OB ultrasound.  Cervical check.    Consults:   Consults     No orders found from 10/10/2020 to 2020.          Hospital Course:  Patient is a 35 y.o. female  currently at 20w4d, presented with low abdominal pain and back pain.  The patient had no vaginal bleeding no leakage of fluid.  Was concerned and wanted to be evaluated.  On labor and delivery, she was observed and her pain improved.  She was given Tylenol which relieved some of her symptoms.  An OB ultrasound was within normal limits.  Urine culture is pending.  Options were discussed with the patient regarding management including observation or discharge home to follow-up in our office if symptoms recurred or worsened.  She agreed with this plan and felt well to go home..  Differential diagnosis discussed with the patient includes UTI, musculoskeletal which is most likely be etiology of her discomfort.  Labor unlikely.  Patient did not have an acute abdomen therefore had no signs or symptoms of appendicitis or evidence of miscarrying.    Pertinent Test Results:   Lab Results (last 72 hours)     Procedure Component Value Units Date/Time    Urinalysis, Microscopic Only - Urine, Clean Catch [[id number]]  (Abnormal) Collected: 20    Specimen: Urine, Clean Catch Updated: 20     RBC, UA 6-12 /HPF      WBC, UA 3-5 /HPF      Bacteria, UA None Seen /HPF      Squamous Epithelial Cells, UA 0-2 /HPF      Hyaline Casts, UA 3-6 /LPF      Methodology Automated Microscopy    Urinalysis With Culture If Indicated - Urine, Clean Catch [[id number]]   (Abnormal) Collected: 11/08/20 2019    Specimen: Urine, Clean Catch Updated: 11/08/20 2044     Color, UA Yellow     Appearance, UA Clear     pH, UA 6.0     Specific Gravity, UA 1.021     Glucose, UA Negative     Ketones, UA Negative     Bilirubin, UA Negative     Blood, UA Moderate (2+)     Protein, UA Negative     Leuk Esterase, UA Negative     Nitrite, UA Negative     Urobilinogen, UA 0.2 E.U./dL    Urine Culture - Urine, Urine, Clean Catch [[id number]] Collected: 11/08/20 2019    Specimen: Urine, Clean Catch Updated: 11/08/20 2040        Imaging Results (Last 72 Hours)     Procedure Component Value Units Date/Time    US Ob Detail Fetal Anatomy Single or First Gestation [[id number]] Collected: 11/08/20 1940     Updated: 11/08/20 2147    Narrative:      EXAMINATION: US OB DETAIL FETAL ANATOMY SINGLE OR FIRST GESTATION    DATE:  11/8/2020 8:17 PM    HISTORY: abdominal pain  COMPARISON:  None.  TECHNIQUE:   Grayscale, color Doppler, and spectral images    FINDINGS:  There is a single live intrauterine pregnancy with an estimated date of delivery based on today's ultrasound measurements of 3/24/2021 corresponding to an estimated gestational age of 20 weeks 4 days +/- 1 week 3 days.  Fetal weight: Estimated fetal weight is 367 g +/- 54 g (13 ounces +/- 2 ounces).  Amnionic fluid: Subjectively normal in amount    Placenta:  Posterior, fundal. Placental echotexture is normal   Fetal positioning: Vertex.  Fetal heart rate: 147 beats/min  Cervix: Not well evaluated.      Impression:        Single live intrauterine pregnancy with an estimated date of delivery of 3/24/2021 based on today's ultrasound measurements.    Electronically signed by:  June Calvert    11/8/2020 7:46 PM          Condition on Discharge:  Good    Vital Signs:  Vitals:    11/08/20 2125 11/08/20 2130 11/08/20 2200 11/08/20 2205   BP: 113/66      Pulse:  87 82 77   Resp:       Temp:       TempSrc:       SpO2:  100% 100% 100%   Weight:       Height:            Physical Exam:     General Appearance:    Alert, cooperative, in no acute distress       Abdomen:     Gravid, soft non-tender, no guarding, no rebound tenderness   Genitalia:   Cervix long and closed per RN.  External exam revealed no bleeding no leakage of fluid.   Extremities:    Fetal Assessment:   Moves all extremities well, no edema, non-tender, no redness   FHT noted via doppler and ultrasound normal.     Overall reassuring fetal status.  Discharge Disposition:  Home.  Patient will follow-up in our office this week if her symptoms do not improve.  She was also advised to call our office on Wednesday to find out what her urine culture result is.  He was not having any signs or symptoms of UTI.    Discharge Medications:     Discharge Medications      ASK your doctor about these medications      Instructions Start Date   methIMAzole 10 MG tablet  Commonly known as: TAPAZOLE   TAKE 1 TABLET BY MOUTH EVERY MORNING AND BEFORE SUPPER      prenatal (CLASSIC) vitamin tablet  Generic drug: prenatal vitamin   Oral, Daily             Activity at Discharge:     Follow-up Appointments  Future Appointments   Date Time Provider Department Center   12/14/2020 12:15 PM Ismael Monsivais MD [facility] None   3/16/2021  8:30 AM Yadira Power, PAJaquelineC [facility] None         Test Results Pending at Discharge  Pending Labs     Order Current Status    Urine Culture - Urine, Urine, Clean Catch In process           Emani Vance MD  11/08/20  22:25 EST

## 2020-11-10 PROCEDURE — 99211 OFF/OP EST MAY X REQ PHY/QHP: CPT

## 2020-12-14 ENCOUNTER — TELEPHONE (OUTPATIENT)
Dept: ENDOCRINOLOGY | Facility: CLINIC | Age: 35
End: 2020-12-14

## 2020-12-14 ENCOUNTER — OFFICE VISIT (OUTPATIENT)
Dept: ENDOCRINOLOGY | Facility: CLINIC | Age: 35
End: 2020-12-14

## 2020-12-14 VITALS
BODY MASS INDEX: 22.08 KG/M2 | SYSTOLIC BLOOD PRESSURE: 110 MMHG | HEIGHT: 62 IN | OXYGEN SATURATION: 99 % | DIASTOLIC BLOOD PRESSURE: 68 MMHG | WEIGHT: 120 LBS | HEART RATE: 85 BPM | TEMPERATURE: 98 F

## 2020-12-14 DIAGNOSIS — E05.90 HYPERTHYROIDISM: Primary | ICD-10-CM

## 2020-12-14 PROCEDURE — 99213 OFFICE O/P EST LOW 20 MIN: CPT | Performed by: INTERNAL MEDICINE

## 2020-12-14 RX ORDER — VALACYCLOVIR HYDROCHLORIDE 1 G/1
TABLET, FILM COATED ORAL
COMMUNITY
Start: 2020-10-07 | End: 2020-12-14

## 2020-12-14 RX ORDER — PROPRANOLOL HYDROCHLORIDE 10 MG/1
TABLET ORAL
COMMUNITY
Start: 2020-11-29 | End: 2020-12-14 | Stop reason: SDUPTHER

## 2020-12-14 RX ORDER — PROPRANOLOL HYDROCHLORIDE 10 MG/1
TABLET ORAL
Qty: 30 TABLET | Refills: 4 | Status: SHIPPED | OUTPATIENT
Start: 2020-12-14 | End: 2021-05-19

## 2020-12-14 RX ORDER — METHIMAZOLE 10 MG/1
TABLET ORAL
Refills: 1
Start: 2020-12-14 | End: 2021-02-17 | Stop reason: SDUPTHER

## 2020-12-14 NOTE — PATIENT INSTRUCTIONS
Get labs drawn soon.  Will call you with the lab results.  Continue methimazole.  F/u in 6 months, with labs prior.

## 2020-12-19 NOTE — PROGRESS NOTES
[facility]        Patient Care Team:  Edna Francois APRN as PCP - General (Family Medicine)  Afua Moe MD as Consulting Physician (Obstetrics and Gynecology)    Chief Complaint:    Chief Complaint   Patient presents with   • Hyperthyroidism         Subjective     Here for thyroid f/u  Currently 24 weeks pregnant. Due date 3/24/2021  Taking methimazole one in am, 2 tab in pm since November  Exercise program: walking    Interval History:     Patient Complaints: palpitations, occasionally  Patient Denies:  swelling  History taken from: patient    Review of Systems:   Review of Systems   HENT: Positive for trouble swallowing.    Eyes: Negative for blurred vision.   Cardiovascular: Positive for palpitations.   Gastrointestinal: Negative for nausea.   Endocrine: Negative for polyuria.   Neurological: Negative for headache.     Gained 16 lb since last visit  Objective     Vital Signs      Vitals:    12/14/20 1239   BP: 110/68   Pulse: 85   Temp: 98 °F (36.7 °C)   SpO2: 99%         Physical Exam:     General Appearance:    Alert, cooperative, in no acute distress   Head:    Normocephalic, without obvious abnormality, atraumatic   Eyes:       Mouth:  Neck:       No periorbital edema. No lid lag    No lesions    32 cm in circumference, thyroid enlarged   Lungs:     Clear     Heart:    Regular rhythm and normal rate   Abdomen:     Normal bowel sounds, soft, 24 weeks pregnant                 Extremities:   Moves all extremities well, no edema or tremors               Pulses:   Pulses palpable and equal bilaterally   Skin:   Dry   Neurologic:  DTR 1+          Results Review:    I have reviewed the patient's new clinical results, labs & imaging.    Medication Review:   Prior to Admission medications    Medication Sig Start Date End Date Taking? Authorizing Provider   methIMAzole (TAPAZOLE) 10 MG tablet TAKE 1 TABLET BY MOUTH EVERY MORNING AND  2 tabs BEFORE SUPPER 12/14/20  Yes  Ismael Monsivais MD   prenatal vitamin (prenatal, CLASSIC, vitamin) tablet Take  by mouth Daily.   Yes Provider, MD Stefano   propranolol (INDERAL) 10 MG tablet Take one daily prn heart palpitations 12/14/20  Yes Ismael Monsivais MD       Lab Results (most recent)     None          Lab Results   Component Value Date    GLUCOSE 61 (L) 10/21/2020    BUN 8 10/21/2020    CREATININE 0.30 (L) 10/21/2020    EGFRIFNONA >150 10/21/2020    BCR 26.7 (H) 10/21/2020    K 4.0 10/21/2020    CO2 22.6 10/21/2020    CALCIUM 9.2 10/21/2020    ALBUMIN 3.50 10/21/2020    LABIL2 0.9 (L) 03/10/2019    AST 13 10/21/2020    ALT 13 10/21/2020     Lab Results   Component Value Date    TSH <0.005 (L) 10/21/2020      Did not get labs drawn prior to this visit.    Assessment/Plan     Diagnoses and all orders for this visit:    1. Hyperthyroidism (Primary)  -     methIMAzole (TAPAZOLE) 10 MG tablet; TAKE 1 TABLET BY MOUTH EVERY MORNING AND  2 tabs BEFORE SUPPER; Refill: 1  -     propranolol (INDERAL) 10 MG tablet; Take one daily prn heart palpitations  Dispense: 30 tablet; Refill: 4    Thyroid status improved clinically    Get labs drawn soon.  Will call you with the lab results.  Continue methimazole same dose for now.        Ismael Monsivais MD  12/18/20  21:03 EST

## 2021-01-04 ENCOUNTER — LAB (OUTPATIENT)
Dept: LAB | Facility: HOSPITAL | Age: 36
End: 2021-01-04

## 2021-01-04 DIAGNOSIS — E05.90 HYPERTHYROIDISM: ICD-10-CM

## 2021-01-04 DIAGNOSIS — Z3A.11 11 WEEKS GESTATION OF PREGNANCY: ICD-10-CM

## 2021-01-04 LAB
FREE T4, MATERNAL: 1.1 NG/DL
TSH SERPL DL<=0.05 MIU/L-ACNC: <0.005 UIU/ML (ref 0.27–4.2)

## 2021-01-04 PROCEDURE — 84439 ASSAY OF FREE THYROXINE: CPT

## 2021-01-04 PROCEDURE — 36415 COLL VENOUS BLD VENIPUNCTURE: CPT

## 2021-01-04 PROCEDURE — 84443 ASSAY THYROID STIM HORMONE: CPT

## 2021-02-17 DIAGNOSIS — E05.90 HYPERTHYROIDISM: ICD-10-CM

## 2021-02-17 RX ORDER — METHIMAZOLE 10 MG/1
TABLET ORAL
Qty: 270 TABLET | Refills: 1 | Status: SHIPPED | OUTPATIENT
Start: 2021-02-17 | End: 2021-03-02

## 2021-02-22 ENCOUNTER — TELEPHONE (OUTPATIENT)
Dept: ENDOCRINOLOGY | Facility: CLINIC | Age: 36
End: 2021-02-22

## 2021-02-23 ENCOUNTER — HOSPITAL ENCOUNTER (INPATIENT)
Facility: HOSPITAL | Age: 36
LOS: 2 days | Discharge: HOME | End: 2021-02-26
Attending: OBSTETRICS & GYNECOLOGY | Admitting: OBSTETRICS & GYNECOLOGY
Payer: MEDICAID

## 2021-02-23 LAB
BASOPHILS # BLD AUTO: 0.1 10*3/MM3 (ref 0–0.2)
BASOPHILS NFR BLD AUTO: 0.5 % (ref 0–1.5)
DEPRECATED RDW RBC AUTO: 42 FL (ref 37–54)
EOSINOPHIL # BLD AUTO: 0.1 10*3/MM3 (ref 0–0.4)
EOSINOPHIL NFR BLD AUTO: 0.5 % (ref 0.3–6.2)
ERYTHROCYTE [DISTWIDTH] IN BLOOD BY AUTOMATED COUNT: 14.1 % (ref 12.3–15.4)
HCT VFR BLD AUTO: 36 % (ref 34–46.6)
HGB BLD-MCNC: 12.4 G/DL (ref 12–15.9)
LYMPHOCYTES # BLD AUTO: 3.1 10*3/MM3 (ref 0.7–3.1)
LYMPHOCYTES NFR BLD AUTO: 23 % (ref 19.6–45.3)
MCH RBC QN AUTO: 29.5 PG (ref 26.6–33)
MCHC RBC AUTO-ENTMCNC: 34.4 G/DL (ref 31.5–35.7)
MCV RBC AUTO: 85.6 FL (ref 79–97)
MONOCYTES # BLD AUTO: 1.1 10*3/MM3 (ref 0.1–0.9)
MONOCYTES NFR BLD AUTO: 8 % (ref 5–12)
NEUTROPHILS NFR BLD AUTO: 68 % (ref 42.7–76)
NEUTROPHILS NFR BLD AUTO: 9.2 10*3/MM3 (ref 1.7–7)
NRBC BLD AUTO-RTO: 0.1 /100 WBC (ref 0–0.2)
PLATELET # BLD AUTO: 244 10*3/MM3 (ref 140–450)
PMV BLD AUTO: 9.3 FL (ref 6–12)
RBC # BLD AUTO: 4.21 10*6/MM3 (ref 3.77–5.28)
WBC # BLD AUTO: 13.5 10*3/MM3 (ref 3.4–10.8)

## 2021-02-23 PROCEDURE — 85460 HEMOGLOBIN FETAL: CPT | Performed by: OBSTETRICS & GYNECOLOGY

## 2021-02-23 PROCEDURE — 86901 BLOOD TYPING SEROLOGIC RH(D): CPT | Performed by: OBSTETRICS & GYNECOLOGY

## 2021-02-23 PROCEDURE — U0003 INFECTIOUS AGENT DETECTION BY NUCLEIC ACID (DNA OR RNA); SEVERE ACUTE RESPIRATORY SYNDROME CORONAVIRUS 2 (SARS-COV-2) (CORONAVIRUS DISEASE [COVID-19]), AMPLIFIED PROBE TECHNIQUE, MAKING USE OF HIGH THROUGHPUT TECHNOLOGIES AS DESCRIBED BY CMS-2020-01-R: HCPCS | Performed by: OBSTETRICS & GYNECOLOGY

## 2021-02-23 PROCEDURE — 86900 BLOOD TYPING SEROLOGIC ABO: CPT | Performed by: OBSTETRICS & GYNECOLOGY

## 2021-02-23 PROCEDURE — 86870 RBC ANTIBODY IDENTIFICATION: CPT | Performed by: OBSTETRICS & GYNECOLOGY

## 2021-02-23 PROCEDURE — G0432 EIA HIV-1/HIV-2 SCREEN: HCPCS | Performed by: OBSTETRICS & GYNECOLOGY

## 2021-02-23 PROCEDURE — 85025 COMPLETE CBC W/AUTO DIFF WBC: CPT | Performed by: OBSTETRICS & GYNECOLOGY

## 2021-02-23 PROCEDURE — 86850 RBC ANTIBODY SCREEN: CPT | Performed by: OBSTETRICS & GYNECOLOGY

## 2021-02-23 PROCEDURE — 86592 SYPHILIS TEST NON-TREP QUAL: CPT | Performed by: OBSTETRICS & GYNECOLOGY

## 2021-02-23 RX ADMIN — SODIUM CHLORIDE, SODIUM LACTATE, POTASSIUM CHLORIDE, AND CALCIUM CHLORIDE 1000 ML: .6; .31; .03; .02 INJECTION, SOLUTION INTRAVENOUS at 23:08

## 2021-02-24 ENCOUNTER — ANESTHESIA (OUTPATIENT)
Dept: LABOR AND DELIVERY | Facility: HOSPITAL | Age: 36
End: 2021-02-24
Payer: MEDICAID

## 2021-02-24 ENCOUNTER — ANESTHESIA EVENT (OUTPATIENT)
Dept: LABOR AND DELIVERY | Facility: HOSPITAL | Age: 36
End: 2021-02-24
Payer: MEDICAID

## 2021-02-24 PROBLEM — Z34.90 PREGNANCY: Status: RESOLVED | Noted: 2021-02-24 | Resolved: 2021-02-24

## 2021-02-24 PROBLEM — Z34.90 PREGNANCY: Status: ACTIVE | Noted: 2021-02-24

## 2021-02-24 PROBLEM — O34.219 VBAC, DELIVERED: Status: ACTIVE | Noted: 2021-02-24

## 2021-02-24 PROBLEM — Z34.90 PREGNANT: Status: RESOLVED | Noted: 2020-11-08 | Resolved: 2021-02-24

## 2021-02-24 LAB
ABO GROUP BLD: NORMAL
ABO GROUP BLD: NORMAL
BLD GP AB SCN SERPL QL: POSITIVE
FETAL BLEED: NEGATIVE
HGB F BLD QL KLEIH BETKE: NORMAL
HIV1+2 AB SER QL: NORMAL
NUMBER OF DOSES: NORMAL
RESIDUAL RHIG DETECTED: NORMAL
RH BLD: NEGATIVE
RH BLD: NEGATIVE
RPR SER QL: NORMAL
SARS-COV-2 RNA PNL SPEC NAA+PROBE: NOT DETECTED
T&S EXPIRATION DATE: NORMAL

## 2021-02-24 PROCEDURE — 86900 BLOOD TYPING SEROLOGIC ABO: CPT | Performed by: OBSTETRICS & GYNECOLOGY

## 2021-02-24 PROCEDURE — 86901 BLOOD TYPING SEROLOGIC RH(D): CPT | Performed by: OBSTETRICS & GYNECOLOGY

## 2021-02-24 PROCEDURE — 25010000002 FENTANYL CITRATE (PF) 1000 MCG/20ML SOLUTION

## 2021-02-24 PROCEDURE — 25010000002 PENICILLIN G POTASSIUM PER 600000 UNITS: Performed by: OBSTETRICS & GYNECOLOGY

## 2021-02-24 PROCEDURE — 85461 HEMOGLOBIN FETAL: CPT | Performed by: OBSTETRICS & GYNECOLOGY

## 2021-02-24 PROCEDURE — 25010000002 ROPIVACAINE PER 1 MG

## 2021-02-24 PROCEDURE — 0UQMXZZ REPAIR VULVA, EXTERNAL APPROACH: ICD-10-PCS | Performed by: OBSTETRICS & GYNECOLOGY

## 2021-02-24 PROCEDURE — C1755 CATHETER, INTRASPINAL: HCPCS | Performed by: STUDENT IN AN ORGANIZED HEALTH CARE EDUCATION/TRAINING PROGRAM

## 2021-02-24 PROCEDURE — 25010000002 ONDANSETRON PER 1 MG: Performed by: OBSTETRICS & GYNECOLOGY

## 2021-02-24 PROCEDURE — 25010000002 RHO D IMMUNE GLOBULIN 1500 UNIT/2ML SOLUTION PREFILLED SYRINGE: Performed by: OBSTETRICS & GYNECOLOGY

## 2021-02-24 RX ORDER — METHIMAZOLE 5 MG/1
20 TABLET ORAL
Status: DISCONTINUED | OUTPATIENT
Start: 2021-02-24 | End: 2021-02-26 | Stop reason: HOSPADM

## 2021-02-24 RX ORDER — LANOLIN 100 %
OINTMENT (GRAM) TOPICAL
Status: DISCONTINUED | OUTPATIENT
Start: 2021-02-24 | End: 2021-02-26 | Stop reason: HOSPADM

## 2021-02-24 RX ORDER — DOCUSATE SODIUM 100 MG/1
100 CAPSULE, LIQUID FILLED ORAL 2 TIMES DAILY
Status: DISCONTINUED | OUTPATIENT
Start: 2021-02-24 | End: 2021-02-26 | Stop reason: HOSPADM

## 2021-02-24 RX ORDER — SODIUM CHLORIDE 0.9 % (FLUSH) 0.9 %
3 SYRINGE (ML) INJECTION EVERY 12 HOURS SCHEDULED
Status: DISCONTINUED | OUTPATIENT
Start: 2021-02-24 | End: 2021-02-24 | Stop reason: HOSPADM

## 2021-02-24 RX ORDER — BISACODYL 10 MG
10 SUPPOSITORY, RECTAL RECTAL DAILY PRN
Status: DISCONTINUED | OUTPATIENT
Start: 2021-02-25 | End: 2021-02-26 | Stop reason: HOSPADM

## 2021-02-24 RX ORDER — OXYTOCIN-SODIUM CHLORIDE 0.9% IV SOLN 30 UNIT/500ML 30-0.9/5 UT/ML-%
999 SOLUTION INTRAVENOUS ONCE
Status: COMPLETED | OUTPATIENT
Start: 2021-02-24 | End: 2021-02-24

## 2021-02-24 RX ORDER — SODIUM CHLORIDE 0.9 % (FLUSH) 0.9 %
1-10 SYRINGE (ML) INJECTION AS NEEDED
Status: DISCONTINUED | OUTPATIENT
Start: 2021-02-24 | End: 2021-02-26 | Stop reason: HOSPADM

## 2021-02-24 RX ORDER — MAGNESIUM CARB/ALUMINUM HYDROX 105-160MG
30 TABLET,CHEWABLE ORAL ONCE
Status: DISCONTINUED | OUTPATIENT
Start: 2021-02-24 | End: 2021-02-24 | Stop reason: HOSPADM

## 2021-02-24 RX ORDER — OXYTOCIN-SODIUM CHLORIDE 0.9% IV SOLN 30 UNIT/500ML 30-0.9/5 UT/ML-%
125 SOLUTION INTRAVENOUS CONTINUOUS PRN
Status: DISCONTINUED | OUTPATIENT
Start: 2021-02-24 | End: 2021-02-24 | Stop reason: HOSPADM

## 2021-02-24 RX ORDER — ONDANSETRON 4 MG/1
4 TABLET, FILM COATED ORAL EVERY 8 HOURS PRN
Status: DISCONTINUED | OUTPATIENT
Start: 2021-02-24 | End: 2021-02-26 | Stop reason: HOSPADM

## 2021-02-24 RX ORDER — HYDROCORTISONE ACETATE PRAMOXINE HCL 2.5; 1 G/100G; G/100G
1 CREAM TOPICAL AS NEEDED
Status: DISCONTINUED | OUTPATIENT
Start: 2021-02-24 | End: 2021-02-26 | Stop reason: HOSPADM

## 2021-02-24 RX ORDER — OXYTOCIN-SODIUM CHLORIDE 0.9% IV SOLN 30 UNIT/500ML 30-0.9/5 UT/ML-%
250 SOLUTION INTRAVENOUS CONTINUOUS
Status: DISPENSED | OUTPATIENT
Start: 2021-02-24 | End: 2021-02-24

## 2021-02-24 RX ORDER — SODIUM CHLORIDE 0.9 % (FLUSH) 0.9 %
10 SYRINGE (ML) INJECTION AS NEEDED
Status: DISCONTINUED | OUTPATIENT
Start: 2021-02-24 | End: 2021-02-24 | Stop reason: HOSPADM

## 2021-02-24 RX ORDER — CARBOPROST TROMETHAMINE 250 UG/ML
250 INJECTION, SOLUTION INTRAMUSCULAR AS NEEDED
Status: DISCONTINUED | OUTPATIENT
Start: 2021-02-24 | End: 2021-02-24 | Stop reason: HOSPADM

## 2021-02-24 RX ORDER — MISOPROSTOL 200 UG/1
800 TABLET ORAL AS NEEDED
Status: DISCONTINUED | OUTPATIENT
Start: 2021-02-24 | End: 2021-02-24 | Stop reason: HOSPADM

## 2021-02-24 RX ORDER — OXYTOCIN-SODIUM CHLORIDE 0.9% IV SOLN 30 UNIT/500ML 30-0.9/5 UT/ML-%
250 SOLUTION INTRAVENOUS CONTINUOUS
Status: ACTIVE | OUTPATIENT
Start: 2021-02-24 | End: 2021-02-24

## 2021-02-24 RX ORDER — METHYLERGONOVINE MALEATE 0.2 MG/ML
200 INJECTION INTRAVENOUS ONCE AS NEEDED
Status: DISCONTINUED | OUTPATIENT
Start: 2021-02-24 | End: 2021-02-24 | Stop reason: HOSPADM

## 2021-02-24 RX ORDER — PROPRANOLOL HYDROCHLORIDE 20 MG/1
10 TABLET ORAL ONCE
Status: COMPLETED | OUTPATIENT
Start: 2021-02-24 | End: 2021-02-24

## 2021-02-24 RX ORDER — ONDANSETRON 2 MG/ML
4 INJECTION INTRAMUSCULAR; INTRAVENOUS EVERY 6 HOURS PRN
Status: DISCONTINUED | OUTPATIENT
Start: 2021-02-24 | End: 2021-02-24

## 2021-02-24 RX ORDER — PRENATAL VIT/IRON FUM/FOLIC AC 27MG-0.8MG
1 TABLET ORAL DAILY
Status: DISCONTINUED | OUTPATIENT
Start: 2021-02-24 | End: 2021-02-26 | Stop reason: HOSPADM

## 2021-02-24 RX ORDER — OXYTOCIN-SODIUM CHLORIDE 0.9% IV SOLN 30 UNIT/500ML 30-0.9/5 UT/ML-%
999 SOLUTION INTRAVENOUS ONCE
Status: DISCONTINUED | OUTPATIENT
Start: 2021-02-24 | End: 2021-02-26 | Stop reason: HOSPADM

## 2021-02-24 RX ORDER — METHIMAZOLE 5 MG/1
10 TABLET ORAL DAILY
Status: DISCONTINUED | OUTPATIENT
Start: 2021-02-25 | End: 2021-02-26 | Stop reason: HOSPADM

## 2021-02-24 RX ORDER — OXYTOCIN-SODIUM CHLORIDE 0.9% IV SOLN 30 UNIT/500ML 30-0.9/5 UT/ML-%
125 SOLUTION INTRAVENOUS CONTINUOUS PRN
Status: DISCONTINUED | OUTPATIENT
Start: 2021-02-24 | End: 2021-02-26 | Stop reason: HOSPADM

## 2021-02-24 RX ORDER — LIDOCAINE HYDROCHLORIDE 10 MG/ML
5 INJECTION, SOLUTION EPIDURAL; INFILTRATION; INTRACAUDAL; PERINEURAL AS NEEDED
Status: DISCONTINUED | OUTPATIENT
Start: 2021-02-24 | End: 2021-02-24 | Stop reason: HOSPADM

## 2021-02-24 RX ORDER — IBUPROFEN 600 MG/1
600 TABLET ORAL EVERY 6 HOURS PRN
Status: DISCONTINUED | OUTPATIENT
Start: 2021-02-24 | End: 2021-02-26 | Stop reason: HOSPADM

## 2021-02-24 RX ORDER — OXYTOCIN-SODIUM CHLORIDE 0.9% IV SOLN 30 UNIT/500ML 30-0.9/5 UT/ML-%
1 SOLUTION INTRAVENOUS
Status: DISCONTINUED | OUTPATIENT
Start: 2021-02-24 | End: 2021-02-24

## 2021-02-24 RX ORDER — SODIUM CHLORIDE, SODIUM LACTATE, POTASSIUM CHLORIDE, CALCIUM CHLORIDE 600; 310; 30; 20 MG/100ML; MG/100ML; MG/100ML; MG/100ML
125 INJECTION, SOLUTION INTRAVENOUS CONTINUOUS
Status: DISCONTINUED | OUTPATIENT
Start: 2021-02-24 | End: 2021-02-24

## 2021-02-24 RX ADMIN — OXYTOCIN 250 ML/HR: 10 INJECTION, SOLUTION INTRAMUSCULAR; INTRAVENOUS at 10:49

## 2021-02-24 RX ADMIN — FENTANYL CITRATE 200 MCG: 0.05 INJECTION, SOLUTION INTRAMUSCULAR; INTRAVENOUS at 09:11

## 2021-02-24 RX ADMIN — HUMAN RHO(D) IMMUNE GLOBULIN 1500 UNITS: 1500 SOLUTION INTRAMUSCULAR; INTRAVENOUS at 14:31

## 2021-02-24 RX ADMIN — ONDANSETRON 4 MG: 2 INJECTION, SOLUTION INTRAMUSCULAR; INTRAVENOUS at 06:23

## 2021-02-24 RX ADMIN — DOCUSATE SODIUM 100 MG: 100 CAPSULE, LIQUID FILLED ORAL at 19:33

## 2021-02-24 RX ADMIN — PENICILLIN G POTASSIUM 5 MILLION UNITS: 5000000 INJECTION, POWDER, FOR SOLUTION INTRAMUSCULAR; INTRAVENOUS at 08:04

## 2021-02-24 RX ADMIN — METHIMAZOLE 20 MG: 5 TABLET ORAL at 18:51

## 2021-02-24 RX ADMIN — OXYTOCIN 999 ML/HR: 10 INJECTION, SOLUTION INTRAMUSCULAR; INTRAVENOUS at 10:09

## 2021-02-24 RX ADMIN — IBUPROFEN 600 MG: 600 TABLET ORAL at 19:34

## 2021-02-24 RX ADMIN — BENZOCAINE 1 SPRAY: 11.4 AEROSOL, SPRAY TOPICAL at 18:12

## 2021-02-24 RX ADMIN — OXYTOCIN 1 MILLI-UNITS/MIN: 10 INJECTION, SOLUTION INTRAMUSCULAR; INTRAVENOUS at 05:56

## 2021-02-24 RX ADMIN — IBUPROFEN 600 MG: 600 TABLET ORAL at 13:33

## 2021-02-24 RX ADMIN — WITCH HAZEL 1 PAD: 500 SOLUTION RECTAL; TOPICAL at 18:12

## 2021-02-24 RX ADMIN — SODIUM CHLORIDE, SODIUM LACTATE, POTASSIUM CHLORIDE, AND CALCIUM CHLORIDE 125 ML/HR: .6; .31; .03; .02 INJECTION, SOLUTION INTRAVENOUS at 03:08

## 2021-02-24 RX ADMIN — Medication 8 ML/HR: at 00:17

## 2021-02-24 RX ADMIN — PROPRANOLOL HYDROCHLORIDE 10 MG: 20 TABLET ORAL at 13:34

## 2021-02-24 NOTE — PLAN OF CARE
Goal Outcome Evaluation:     Baby breastfeeding better. Baby throughout day. Pt has no c/o pain. Bleeding is scant/small. Baby @ pts BSD.

## 2021-02-24 NOTE — H&P
KEMAL [facility]ine  Obstetric History and Physical     Chief Complaint: labor    Subjective     Patient is a 35 y.o. female  currently at 36 wks , who presents with painful regular contractions.    Her prenatal care is complicated by maternal hyperthyroidism with borderline control during pregnancy, she has been followed by endocrine and is on methimazole and propranolol but most recent TSH was still elevated, pt reports hasn't followed up with endocrine in a few weeks.  She also has severe anxiety. Her previous obstetric/gynecological history is noted for prior C/S x 1 and desiring sterilization.  Her prior C/S was due to NRFS during labor and was in 2019.      She was admitted last night c Dr. Fam and due to her painful contractions and cervical change he had discussed c her repeat C/S v , and she desired TOLAC.  She was augmented c low dose pitocin overnight.       Prenatal Information:  See office H&P for full details and labs.      Past OB History:       OB History    Para Term  AB Living   3 1 0 0 1 0   SAB TAB Ectopic Molar Multiple Live Births   1 0 0 0 1 1               Past Medical History: Past Medical History:   Diagnosis Date   • Depression    • Thyroid disease         Past Surgical History Past Surgical History:   Procedure Laterality Date   •  SECTION  2019        Family History: Family History   Problem Relation Age of Onset   • Alcohol abuse Father    • Diabetes Paternal Aunt       Social History:  reports that she has never smoked. She has never used smokeless tobacco.   reports previous alcohol use of about 1.0 standard drinks of alcohol per week.  Drug use questions deferred to the physician.        General ROS: Pertinent items are noted in HPI    Objective      Vitals:     Vitals:    21 0600 21 0630 21 0700 21 0730   BP: 101/63 99/67 103/68 96/57   BP Location:   Right arm    Patient Position:   Lying    Pulse: 107 82 90 73    Resp:   18    Temp:   98.4 °F (36.9 °C)    TempSrc:   Oral    SpO2: 98% 98% 98% 99%   Weight:       Height:           Fetal Heart Rate Assessment:   Category 1    [place]:   q 3     Physical Exam:     General Appearance:    Alert, cooperative, in no acute distress   Abdomen:     Soft, non-tender, no guarding, no rebound tenderness,       EFW < 7#   Pelvic Exam:    Pelvis adequate.    Presentation: Vertex    Cervix: was checked (by RN): 5 cm / complete % / -1   Extremities:   Moves all extremities well, no edema, no cyanosis, no              redness   Skin:   No bleeding, bruising or rash   Neurologic:   No focal neurologic defect          Laboratory Results:   Lab Results (last 48 hours)     Procedure Component Value Units Date/Time    COVID PRE-OP / PRE-PROCEDURE SCREENING ORDER (NO ISOLATION) - Swab, Nasopharynx [[id number]]  (Normal) Collected: 02/23/21 2335    Specimen: Swab from Nasopharynx Updated: 02/24/21 0052    Narrative:      The following orders were created for panel order COVID PRE-OP / PRE-PROCEDURE SCREENING ORDER (NO ISOLATION) - Swab, Nasopharynx.  Procedure                               Abnormality         Status                     ---------                               -----------         ------                     COVID-19,CEPHEID,COR/ARGELIA...[[id number]]  Normal              Final result                 Please view results for these tests on the individual orders.    COVID-19,CEPHEID,COR/ARGELIA/PAD IN-HOUSE(OR EMERGENT/ADD-ON),NP SWAB IN TRANSPORT MEDIA 3-4 HR TAT, RT-PCR - Swab, Nasopharynx [[id number]]  (Normal) Collected: 02/23/21 2335    Specimen: Swab from Nasopharynx Updated: 02/24/21 0052     COVID19 Not Detected    Narrative:      Fact sheet for providers: https://www.fda.gov/media/080942/download     Fact sheet for patients: https://www.fda.gov/media/495807/download    HIV-1 & HIV-2 Antibodies [[id number]]  (Normal) Collected: 02/23/21 2312    Specimen: Blood Updated: 02/24/21 0016     Narrative:      The following orders were created for panel order HIV-1 & HIV-2 Antibodies.  Procedure                               Abnormality         Status                     ---------                               -----------         ------                     HIV-1 / O / 2 Ag / Antib...[[id number]]  Normal              Final result                 Please view results for these tests on the individual orders.    HIV-1 / O / 2 Ag / Antibody 4th Generation [[id number]]  (Normal) Collected: 02/23/21 2312    Specimen: Blood Updated: 02/24/21 0016     HIV-1/ HIV-2 Non-Reactive     Comment: A non-reactive test result does not preclude the possibility of exposure to HIV or infection with HIV. An antibody response to recent exposure may take several months to reach detectable levels.       Narrative:      The HIV antibody/antigen combo assay is a qualitative assay for HIV that includes the p24 antigen as well as antibodies to HIV types 1 and 2. This test is intended to be used as a screening assay in the diagnosis of HIV infection in patients over the age of 2.  Results may be falsely decreased if patient taking Biotin.      CBC & Differential [[id number]]  (Abnormal) Collected: 02/23/21 2311    Specimen: Blood Updated: 02/23/21 2338    Narrative:      The following orders were created for panel order CBC & Differential.  Procedure                               Abnormality         Status                     ---------                               -----------         ------                     CBC Auto Differential[[id number]]        Abnormal            Final result                 Please view results for these tests on the individual orders.    CBC Auto Differential [[id number]]  (Abnormal) Collected: 02/23/21 2311    Specimen: Blood Updated: 02/23/21 2338     WBC 13.50 10*3/mm3      RBC 4.21 10*6/mm3      Hemoglobin 12.4 g/dL      Hematocrit 36.0 %      MCV 85.6 fL      MCH 29.5 pg      MCHC 34.4 g/dL      RDW 14.1 %       RDW-SD 42.0 fl      MPV 9.3 fL      Platelets 244 10*3/mm3      Neutrophil % 68.0 %      Lymphocyte % 23.0 %      Monocyte % 8.0 %      Eosinophil % 0.5 %      Basophil % 0.5 %      Neutrophils, Absolute 9.20 10*3/mm3      Lymphocytes, Absolute 3.10 10*3/mm3      Monocytes, Absolute 1.10 10*3/mm3      Eosinophils, Absolute 0.10 10*3/mm3      Basophils, Absolute 0.10 10*3/mm3      nRBC 0.1 /100 WBC     RPR [[id number]] Collected: 21    Specimen: Blood Updated: 21 4961             Assessment/Plan     Active Problems:    Pregnant         Assessment:  1.  Intrauterine pregnancy at 36 wks gestation with reactive fetal status.    2.   labor  3.  GBS status:Unknown  4. Prior C/S x 1 desiring TOLAC.     Plan:  1.  R/B/A reviewed, discussed risk for uterine rupture as well as risks of , baby looks great and pt progressing well, agree c TOLAC, pt agrees as well.  GBS unknown, PCN started.   2. Plan of care has been reviewed with patient.  3.  Risks, benefits of treatment plan have been discussed.  4.  All questions have been answered.         Afua Moe MD   2021   08:22 EST

## 2021-02-24 NOTE — SIGNIFICANT NOTE
presents @ 35.6wk with c/o intermittent abd pain. Denies bleeding or LOF. Rates pain 10. Abd palpates soft and nontender. SVE 1-2/50/-2. No vag bleeding noted. Pt states she is a repeat c/s with this pregnancy. 1st c/s was for abruption. RN remains at bedside completing assessment. Orders received.

## 2021-02-24 NOTE — ANESTHESIA PREPROCEDURE EVALUATION
Anesthesia Evaluation     Patient summary reviewed and Nursing notes reviewed   no history of anesthetic complications:  NPO Solid Status: > 6 hours  NPO Liquid Status: > 2 hours           Airway   Mallampati: II  TM distance: >3 FB  Neck ROM: full  No difficulty expected  Dental - normal exam     Pulmonary - negative pulmonary ROS and normal exam   Cardiovascular - negative cardio ROS and normal exam        Neuro/Psych  (+) psychiatric history Anxiety and Depression,     GI/Hepatic/Renal/Endo    (+)  GERD,      Musculoskeletal (-) negative ROS    Abdominal  - normal exam   Substance History - negative use     OB/GYN    (+) Pregnant,         Other - negative ROS                       Anesthesia Plan    ASA 2     epidural     intravenous induction     Anesthetic plan, all risks, benefits, and alternatives have been provided, discussed and informed consent has been obtained with: patient.  Use of blood products discussed with patient  Consented to blood products.

## 2021-02-24 NOTE — PLAN OF CARE
Goal Outcome Evaluation:     Patient given ice and motrin for back pain. Patients bleeding scant/small. Patient encouraged to bottle feed the baby every 3 hours until she is feeding better. Baby @ mothers BSD.

## 2021-02-24 NOTE — PLAN OF CARE
Problem: Adult Inpatient Plan of Care  Goal: Plan of Care Review  Outcome: Ongoing, Progressing  Goal: Patient-Specific Goal (Individualized)  Outcome: Ongoing, Progressing  Goal: Absence of Hospital-Acquired Illness or Injury  Outcome: Ongoing, Progressing  Goal: Optimal Comfort and Wellbeing  Outcome: Ongoing, Progressing  Goal: Readiness for Transition of Care  Outcome: Ongoing, Progressing  Intervention: Mutually Develop Transition Plan  Recent Flowsheet Documentation  Taken 2/24/2021 0040 by Keara Cleary, RN  Transportation Anticipated: car, drives self  Patient/Family Anticipated Services at Transition: none  Patient/Family Anticipates Transition to: home  Taken 2/24/2021 0024 by Keara Cleary, RN  Equipment Currently Used at Home: none   Goal Outcome Evaluation:

## 2021-02-24 NOTE — ANESTHESIA POSTPROCEDURE EVALUATION
Patient: Ora Lynn    Procedure Summary     Date: 02/24/21 Room / Location:     Anesthesia Start: 0001 Anesthesia Stop: 1006    Procedure: LABOR ANALGESIA Diagnosis:     Scheduled Providers:  Provider: Issac Ruby MD    Anesthesia Type: epidural ASA Status: 2          Anesthesia Type: epidural    Vitals  Vitals Value Taken Time   BP 95/49 02/24/21 1100   Temp 98.4 °F (36.9 °C) 02/24/21 1030   Pulse 79 02/24/21 1104   Resp 18 02/24/21 1045   SpO2 98 % 02/24/21 1104   Vitals shown include unvalidated device data.        Post Anesthesia Care and Evaluation    Patient location during evaluation: PACU  Patient participation: complete - patient participated  Level of consciousness: awake  Pain scale: See nurse's notes for pain score.  Pain management: adequate  Airway patency: patent  Anesthetic complications: No anesthetic complications  PONV Status: none  Cardiovascular status: acceptable  Respiratory status: acceptable  Hydration status: acceptable    Comments: Patient seen and examined postoperatively; vital signs stable; SpO2 greater than or equal to 90%; cardiopulmonary status stable; nausea/vomiting adequately controlled; pain adequately controlled; no apparent anesthesia complications; patient discharged from anesthesia care when discharge criteria were met

## 2021-02-24 NOTE — SIGNIFICANT NOTE
Reported ctx's q 2-3 minutes. Category I trace. RN continues to remain at bedside. Orders received

## 2021-02-24 NOTE — L&D DELIVERY NOTE
[facility]  Vaginal Delivery Note    Diagnosis     1. Patient is a 35 y.o. female  currently at 36w0d, who presents with  labor.  2. Prior C/S desiring   3. GBS Unknown  4. Maternal hyperthyroidism      Delivery     Delivery:    Date of Delivery:  2021   Anesthesia:  Epidural   Delivering clinician: Afua Moe MD      Delivery narrative:  Pt presented in labor at 36 wks.  She had a prior C/S and was counselled re: her option for repeat C/S versus .  She desired a .  She had progressed to 4 cm dilated and progressed to 5 cm, she was augmented d/t irregular ctxns.  She rec'd one dose of PCN for GBS unknown.  She progressed on a normal labor curve with a category 1 fetal tracing throughout.  She pushed with excellent effort for less than 10 minutes to deliver a vigorous infant without difficulty.  [facility] was examined p placenta delivery and noted to be normal and free of any defects.     Infant    Findings: VFI     Apgars: 8 & 9 at 1 and 5 minutes.      Placenta, Cord, and Fluid    Placenta delivered  spontaneous  3VC    Bimanual massage and Pitocin 30 U in 500cc bolus given after placental delivery.  There was good hemostasis and a firm uterine tone.        Lacerations       L labial laceration repaired c 3.0 vicryl rapide in the usual fashion.    Estimated Blood Loss  300         Disposition  Mother to Mother Baby/Postpartum  in stable condition currently.  Baby to remains with mom  in stable condition currently.      Afua Moe MD  21  10:25 EST

## 2021-02-24 NOTE — PROGRESS NOTES
" [facility]  Obstetric Progress Note    Subjective   Comfortable c epidural.  PCN 1st dose complete.     Objective     Vitals:  Vitals:    21 0700 21 0730 21 0800 21 0830   BP: 103/68 96/57 92/58 109/46   BP Location: Right arm      Patient Position: Lying      Pulse: 90 73 75 77   Resp: 18  18    Temp: 98.4 °F (36.9 °C)      TempSrc: Oral      SpO2: 98% 99% 99% 98%   Weight:       Height:         Flowsheet Rows      First Filed Value   Admission Height  154.9 cm (61\") Documented at 2021 2243   Admission Weight  55.2 kg (121 lb 11.1 oz) Documented at 2021 2243          Intake/Output Summary (Last 24 hours) at 2021 0854  Last data filed at 2021 0626  Gross per 24 hour   Intake 1495.15 ml   Output 450 ml   Net 1045.15 ml       Fetal Heart Rate Assessment:   Category 1  [place]:  q 3    Physical Exam:  General: Patient is in no acute distress    Pelvic Exam: was checked (by me): 8 cm / complete % / -1, AROM- Clear and IUPC placed without difficulty            Assessment/Plan     Active Problems:    Pregnant         Assessment:  1.  Intrauterine pregnancy at 36w0d gestation with reactive fetal status.    2.   labor, prior C/S desiring TOLAC/   3.  GBS status: Unknown    Plan:  1. Cont PCN for GBS, progressing well, expect .    2. Plan of care has been reviewed with patient.  3.  Risks, benefits of treatment plan have been discussed.  4.  All questions have been answered.        Afua Moe MD  2021  08:54 EST    "

## 2021-02-24 NOTE — ANESTHESIA PROCEDURE NOTES
Labor Epidural    Pre-sedation assessment completed: 2/24/2021 12:01 AM    Patient reassessed immediately prior to procedure    Patient location during procedure: OB  Start Time: 2/24/2021 12:01 AM  Stop Time: 2/24/2021 12:17 AM  Indication:at surgeon's request  Performed By  Anesthesiologist: Issac Ruby MD  Preanesthetic Checklist  Completed: patient identified, site marked, surgical consent, pre-op evaluation, timeout performed, IV checked, risks and benefits discussed and monitors and equipment checked  Prep:  Pt Position:sitting  Sterile Tech:cap, gloves, gown, mask and sterile barrier  Prep:chlorhexidine gluconate and isopropyl alcohol  Monitoring:blood pressure monitoring, continuous pulse oximetry and EKG  Epidural Block Procedure:  Approach:midline  Guidance:palpation technique  Location:L2-L3  Needle Type:Tuohy  Needle Gauge:18 G  Loss of Resistance Medium: saline  Loss of Resistance: 4cm  Cath Depth at skin:9 cm  Paresthesia: none  Aspiration:negative  Test Dose:negative  Med administered at 2/24/2021 12:17 AM  Number of Attempts: 1  Post Assessment:  Dressing:biopatch applied, occlusive dressing applied and secured with tape  Pt Tolerance:patient tolerated the procedure well with no apparent complications  Complications:no

## 2021-02-25 LAB
BASOPHILS # BLD AUTO: 0 10*3/MM3 (ref 0–0.2)
BASOPHILS NFR BLD AUTO: 0.2 % (ref 0–1.5)
DEPRECATED RDW RBC AUTO: 43.8 FL (ref 37–54)
EOSINOPHIL # BLD AUTO: 0.1 10*3/MM3 (ref 0–0.4)
EOSINOPHIL NFR BLD AUTO: 0.7 % (ref 0.3–6.2)
ERYTHROCYTE [DISTWIDTH] IN BLOOD BY AUTOMATED COUNT: 14.4 % (ref 12.3–15.4)
HCT VFR BLD AUTO: 27.2 % (ref 34–46.6)
HGB BLD-MCNC: 9.5 G/DL (ref 12–15.9)
LYMPHOCYTES # BLD AUTO: 2.2 10*3/MM3 (ref 0.7–3.1)
LYMPHOCYTES NFR BLD AUTO: 19.4 % (ref 19.6–45.3)
MCH RBC QN AUTO: 30.1 PG (ref 26.6–33)
MCHC RBC AUTO-ENTMCNC: 35 G/DL (ref 31.5–35.7)
MCV RBC AUTO: 85.9 FL (ref 79–97)
MONOCYTES # BLD AUTO: 0.8 10*3/MM3 (ref 0.1–0.9)
MONOCYTES NFR BLD AUTO: 6.9 % (ref 5–12)
NEUTROPHILS NFR BLD AUTO: 72.8 % (ref 42.7–76)
NEUTROPHILS NFR BLD AUTO: 8.1 10*3/MM3 (ref 1.7–7)
NRBC BLD AUTO-RTO: 0 /100 WBC (ref 0–0.2)
PLATELET # BLD AUTO: 169 10*3/MM3 (ref 140–450)
PMV BLD AUTO: 8.7 FL (ref 6–12)
RBC # BLD AUTO: 3.16 10*6/MM3 (ref 3.77–5.28)
WBC # BLD AUTO: 11.2 10*3/MM3 (ref 3.4–10.8)

## 2021-02-25 PROCEDURE — 90715 TDAP VACCINE 7 YRS/> IM: CPT | Performed by: OBSTETRICS & GYNECOLOGY

## 2021-02-25 PROCEDURE — 85025 COMPLETE CBC W/AUTO DIFF WBC: CPT | Performed by: OBSTETRICS & GYNECOLOGY

## 2021-02-25 PROCEDURE — 90471 IMMUNIZATION ADMIN: CPT | Performed by: OBSTETRICS & GYNECOLOGY

## 2021-02-25 PROCEDURE — 25010000002 TDAP 5-2.5-18.5 LF-MCG/0.5 SUSPENSION: Performed by: OBSTETRICS & GYNECOLOGY

## 2021-02-25 RX ORDER — HYDROCODONE BITARTRATE AND ACETAMINOPHEN 5; 325 MG/1; MG/1
1 TABLET ORAL EVERY 6 HOURS PRN
Status: DISCONTINUED | OUTPATIENT
Start: 2021-02-25 | End: 2021-02-26 | Stop reason: HOSPADM

## 2021-02-25 RX ADMIN — HYDROCODONE BITARTRATE AND ACETAMINOPHEN 1 TABLET: 5; 325 TABLET ORAL at 12:58

## 2021-02-25 RX ADMIN — IBUPROFEN 600 MG: 600 TABLET ORAL at 09:08

## 2021-02-25 RX ADMIN — IBUPROFEN 600 MG: 600 TABLET ORAL at 22:52

## 2021-02-25 RX ADMIN — TETANUS TOXOID, REDUCED DIPHTHERIA TOXOID AND ACELLULAR PERTUSSIS VACCINE, ADSORBED 0.5 ML: 5; 2.5; 8; 8; 2.5 SUSPENSION INTRAMUSCULAR at 09:09

## 2021-02-25 RX ADMIN — METHIMAZOLE 10 MG: 5 TABLET ORAL at 07:57

## 2021-02-25 RX ADMIN — DOCUSATE SODIUM 100 MG: 100 CAPSULE, LIQUID FILLED ORAL at 19:26

## 2021-02-25 RX ADMIN — IBUPROFEN 600 MG: 600 TABLET ORAL at 17:29

## 2021-02-25 RX ADMIN — PRENATAL VIT W/ FE FUMARATE-FA TAB 27-0.8 MG 1 TABLET: 27-0.8 TAB at 07:57

## 2021-02-25 RX ADMIN — IBUPROFEN 600 MG: 600 TABLET ORAL at 02:36

## 2021-02-25 RX ADMIN — METHIMAZOLE 20 MG: 5 TABLET ORAL at 19:16

## 2021-02-25 RX ADMIN — HYDROCODONE BITARTRATE AND ACETAMINOPHEN 1 TABLET: 5; 325 TABLET ORAL at 19:26

## 2021-02-25 RX ADMIN — DOCUSATE SODIUM 100 MG: 100 CAPSULE, LIQUID FILLED ORAL at 07:57

## 2021-02-25 NOTE — PLAN OF CARE
Goal Outcome Evaluation:      Pt tolerating pain with motrin and norco. Voiding without difficulty, up ad joce, significant other at bedside attentive to pt, continue to monitor.

## 2021-02-25 NOTE — PLAN OF CARE
Goal Outcome Evaluation:  Plan of Care Reviewed With: patient  Progress: improving  Outcome Summary: Patient rested throughout the night. Pain controlled with motrin.

## 2021-02-25 NOTE — PROGRESS NOTES
KEMAL Levine  Postpartum Note    Subjective   Postpartum Day 1:  Spontaneous Vaginal Delivery    Patient without complaints. Her pain is well controlled with nonsteroidal anti-inflammatory drugs. She is ambulating well.  Patient describes her bleeding as thin lochia.    Breastfeeding: declines.    Objective     Vitals:  Vitals:    21 1900 21 2300 21 0251 21 0700   BP: 91/58 (!) 83/40 (!) 84/47 96/50   BP Location: Right arm Left arm Left arm Right arm   Patient Position: Sitting Lying Lying Lying   Pulse: 72 67 82 78   Resp: 18 16 16 16   Temp: 98.4 °F (36.9 °C) 98.2 °F (36.8 °C) 98.2 °F (36.8 °C) 98.7 °F (37.1 °C)   TempSrc: Oral Oral Oral Oral   SpO2: 98% 100% 99% 99%   Weight:       Height:           Physical Exam:  General:  Alert and oriented x3. No acute distress.  Abdomen: abdomen is soft without significant tenderness, masses, organomegaly or guarding. Fundus: appropriate, firm, non tender  Incision: N/A  Skin: Warm, Dry  Extremities: Normal,  trace edema. Nontender     Labs:  Results from last 7 days   Lab Units 21  0553 21  2311   WBC 10*3/mm3 11.20* 13.50*   HEMOGLOBIN g/dL 9.5* 12.4   HEMATOCRIT % 27.2* 36.0   PLATELETS 10*3/mm3 169 244            Feeding method: Breastfeeding Status: No     Blood Type:Rh Neg      Assessment/Plan   Active Problems:    , delivered      Ora Lynn is Day 1  post-partum from a  Spontaneous Vaginal Delivery      Plan:  routine and continue present management.       Lara Mattson, APRVERONIKA  2021  14:27 EST

## 2021-02-25 NOTE — PLAN OF CARE
Goal Outcome Evaluation:  Plan of Care Reviewed With: patient  Progress: improving  Patient breastfeeding well. Pain controlled with motrin.

## 2021-02-26 VITALS
TEMPERATURE: 98.7 F | BODY MASS INDEX: 22.98 KG/M2 | RESPIRATION RATE: 16 BRPM | HEIGHT: 61 IN | HEART RATE: 88 BPM | DIASTOLIC BLOOD PRESSURE: 65 MMHG | SYSTOLIC BLOOD PRESSURE: 104 MMHG | WEIGHT: 121.69 LBS | OXYGEN SATURATION: 96 %

## 2021-02-26 PROBLEM — Z3A.11 11 WEEKS GESTATION OF PREGNANCY: Status: RESOLVED | Noted: 2020-09-04 | Resolved: 2021-02-26

## 2021-02-26 LAB
T4 FREE SERPL-MCNC: 0.63 NG/DL (ref 0.93–1.7)
TSH SERPL DL<=0.05 MIU/L-ACNC: 12.84 UIU/ML (ref 0.27–4.2)

## 2021-02-26 PROCEDURE — 99222 1ST HOSP IP/OBS MODERATE 55: CPT | Performed by: PSYCHIATRY & NEUROLOGY

## 2021-02-26 PROCEDURE — 84439 ASSAY OF FREE THYROXINE: CPT | Performed by: OBSTETRICS & GYNECOLOGY

## 2021-02-26 PROCEDURE — 84443 ASSAY THYROID STIM HORMONE: CPT | Performed by: OBSTETRICS & GYNECOLOGY

## 2021-02-26 RX ORDER — ESCITALOPRAM OXALATE 10 MG/1
10 TABLET ORAL DAILY
Status: DISCONTINUED | OUTPATIENT
Start: 2021-02-26 | End: 2021-02-26 | Stop reason: HOSPADM

## 2021-02-26 RX ORDER — IBUPROFEN 600 MG/1
600 TABLET ORAL EVERY 6 HOURS PRN
Qty: 30 TABLET | Refills: 1 | Status: SHIPPED | OUTPATIENT
Start: 2021-02-26 | End: 2021-12-15

## 2021-02-26 RX ORDER — ESCITALOPRAM OXALATE 10 MG/1
10 TABLET ORAL DAILY
Qty: 30 TABLET | Refills: 2 | Status: SHIPPED | OUTPATIENT
Start: 2021-02-26 | End: 2021-05-03 | Stop reason: ALTCHOICE

## 2021-02-26 RX ADMIN — DOCUSATE SODIUM 100 MG: 100 CAPSULE, LIQUID FILLED ORAL at 08:41

## 2021-02-26 RX ADMIN — IBUPROFEN 600 MG: 600 TABLET ORAL at 16:17

## 2021-02-26 RX ADMIN — METHIMAZOLE 10 MG: 5 TABLET ORAL at 08:42

## 2021-02-26 RX ADMIN — BENZOCAINE 1 SPRAY: 11.4 AEROSOL, SPRAY TOPICAL at 08:51

## 2021-02-26 RX ADMIN — METHIMAZOLE 20 MG: 5 TABLET ORAL at 18:30

## 2021-02-26 RX ADMIN — HYDROCODONE BITARTRATE AND ACETAMINOPHEN 1 TABLET: 5; 325 TABLET ORAL at 03:33

## 2021-02-26 RX ADMIN — WITCH HAZEL 1 PAD: 500 SOLUTION RECTAL; TOPICAL at 08:51

## 2021-02-26 RX ADMIN — HYDROCODONE BITARTRATE AND ACETAMINOPHEN 1 TABLET: 5; 325 TABLET ORAL at 17:18

## 2021-02-26 RX ADMIN — HYDROCODONE BITARTRATE AND ACETAMINOPHEN 1 TABLET: 5; 325 TABLET ORAL at 11:20

## 2021-02-26 RX ADMIN — ESCITALOPRAM OXALATE 10 MG: 10 TABLET ORAL at 16:18

## 2021-02-26 RX ADMIN — PRENATAL VIT W/ FE FUMARATE-FA TAB 27-0.8 MG 1 TABLET: 27-0.8 TAB at 08:42

## 2021-02-26 NOTE — NURSING NOTE
Pt called RN to room and was tearful. Verbalized feeling sad and overwhelmed with care of infant and concerns about postpartum depression. Verbalized concerns that fluctuating moods could be related to her thyroid. Patient stated she wanted her thyroid levels checked prior to discharge. RN educated and provided patient with handout on baby blues and PP depression. Validated feelings and reassured patient that her emotions were normal at this time. Helped patient with feeding infant, and additional help provided from nursery RN. Patient voiced wanting to stay and additional day so that she can have more time feel comfortable in caring for herself as well as infant. Will pass this on to next shift and told patient to be open and honest about her feelings when OB rounds on her in AM. Pt feeling a little better after discussion and reinforcement of infant care from me and nursery RN. Cont to monitor.

## 2021-02-26 NOTE — CONSULTS
"Social Work Assessment   Abner     Patient Name: Ora Lynn  MRN: [id number]  Today's Date: 2/26/2021    Admit Date: 2/23/2021    Discharge Plan     Row Name 02/26/21 1441       Plan    Plan  DC Plan: Home at d/c with supportive family.      SW consulted d/t onset of PPD depression symptoms. In addition, psych consult placed. Met with pt/FOB at bedside. Pt tearful during conversation & expressed emotions, such as \"guilt\" and \"embarrassment.” Pt with severe anxiety. Validated pt’s feelings and provided emotional support during conversation. Discussed at length relationship interactions at home with family, support pt’s mother can provide when visiting, historic PPD after her son was born, and anxiety related to anticipated discharge. Pt reports that she tried medication after her son’s birth, but did not take consistently as prescribed. In addition, pt started counseling, but did not connect with therapist. OB NP was starting Lexapro 10mg/day. Discussed taking consistently, as prescribed to assist. Discussed identifying a new counselor familiar to PPD - SW to work on this & contact pt within the week via phone. Multiple phone calls out to local/non-local counselors to see who can accommodate pt’s needs as well as takes pt’s insurance. SW discussed PPD risk factors, provided 24/7 helpline information. Discussed importance of addressing PPD concerns and identifying a plan. Pt reports her mother is visiting from out of state & will stay for one month with her at home to help with the adjustment process. Pt reports having necessary items for infant at home & was able to identify both FOB/her mother as positive supports. This SW left her contact information with pt to call with any additional questions.     Met with patient in room wearing PPE: mask, face shield/goggles, gloves, gown.    Maintained distance greater than six feet and spent less than 15 minutes in the room.    BRANDON Echavarria  Social " Worker  Phone: [phone]  Cell: [phone]  Fax: [phone]  [email or web address]

## 2021-02-26 NOTE — DISCHARGE SUMMARY
[facility]  Delivery Discharge Summary    Primary OB Clinician: Afua Moe MD    Admission Diagnosis:  Active Problems:    , delivered  36wk IUP  PTL  Prior c/s    Hyperthyroidism    Discharge Diagnosis:  Same, delivered,     Gestational Age: 36w0d    Date of Delivery: 2021     Delivered By:  Afua Moe     Delivery Type: , Spontaneous      Tubal Ligation: n/a    Intrapartum Course: Uncomplicated delivery.     Postpartum Course:  Pt was admitted and underwent  Spontaneous Vaginal Delivery. Pt was transferred to PP where she had an uncomplicated course. Pt remained AFVSS, had scant lochia and pain was well controlled. Pt d/c home in stable condition and will f/u in office for PP visit as scheduled or PRN. Currently bottle feeding. Plans on condoms  for contraception. Pt feels overwhelmed, sad, and anxious about going home with baby. States was supposed to have c/s but delivered  at 36wks. Pt worried baby won't take the bottle for her when she gets home. Has hx of depression/anxiety. Pt desires to restart Lexapro. Was not on SSRI during pregnancy.  and psych consult appreciated.     Physical Exam:    Vitals:   Vitals:    21 0700 21 1500 21 2246 21 0700   BP: 96/50 99/61 101/60 99/57   BP Location: Right arm Right arm Right arm Right arm   Patient Position: Lying Lying Lying Lying   Pulse: 78 79 81 81   Resp: 16 16 16 17   Temp: 98.7 °F (37.1 °C) 98.9 °F (37.2 °C) 98.6 °F (37 °C) 98.7 °F (37.1 °C)   TempSrc: Oral Oral Oral Oral   SpO2: 99% 98% 99% 98%   Weight:       Height:         Temp (24hrs), Av.7 °F (37.1 °C), Min:98.6 °F (37 °C), Max:98.9 °F (37.2 °C)      General Appearance:    Alert, cooperative, in no acute distress   Abdomen:     Soft non-tender, non-distended, no guarding, no rebound         tenderness.   Extremities:   Moves all extremities well, no edema, no cyanosis, no              Redness.   Incision:   n/a   Fundus:    Firm, below umbilicus     Feeding method: Breastfeeding Status: No    Labs:  Results from last 7 days   Lab Units 02/25/21  0553 02/23/21  2311   WBC 10*3/mm3 11.20* 13.50*   HEMOGLOBIN g/dL 9.5* 12.4   HEMATOCRIT % 27.2* 36.0   PLATELETS 10*3/mm3 169 244           Blood Type: RH Negative Rhogam Given       Plan:  Discharge to home.     and psych consult prior to d/c  Lexapro 10mg daily  Followup with endocrine postpartum  Follow-up appointment with Dr Moe in 6 weeks and in 2 weeks.    Moni Delcid, APRN  2/26/2021  09:47 EST      /d

## 2021-02-26 NOTE — PLAN OF CARE
"Goal Outcome Evaluation:  Plan of Care Reviewed With: patient  Progress: no change  Outcome Summary: Pt voiced concerns about readiness for discharge today. Concerned about her fluctuating moods and feeling \"depressed\". Concerned about caring for infant and adjusting to new role as mother of two. She does have support at home but wants to stay another day. Pt concerned about infant poor feedings. Feedings have improved, but infant has been in nursery all night. Pain managed with PRN medications. Cont to monitor. Pt may not be appropriate for discharge today.  "

## 2021-02-26 NOTE — PLAN OF CARE
Goal Outcome Evaluation:   Discharge instructions given and explained. F/u appts scheduled. Pt to continue as border for  care.

## 2021-02-26 NOTE — CONSULTS
Referring Provider: Moni Delcid   Reason for Consultation: depression       Chief complaint depression     Subjective .     History of present illness:  The patient is a 35 y.o. female who was admitted due to regular contractions  At 36 weeks pregnancy. PMH: depression , thyroid disease , PSyhc consult was requested by Moni STACY 2ry to depression.  The patient acknowledged long history of depression that became worse during her first pregnancy 3 years ago, at that time she started taking Zoloft and Lexapro, noticed improvement, she stopped taking Lexapro after she found out she was pregnant again, the patient reported feeling depressed, but was able to manage without any medications, the patient rated depression as a 7 out of 10, complained of uncontrollable crying spells without any reason, but the patient denied feeling hopeless or helpless, she complained of decreased motivation's, poor interest, did not express suicidal or homicidal ideations.  The patient worried about her baby, expressed reasonable concerns.  The patient did not report any perceptual disturbances, did not bring up any delusional content for the discussion, denied alcohol or illicit drug use.  Past psychiatric history: Depression, no history of suicide attempts, past medications including Zoloft and Lexapro      Review of Systems   All systems were reviewed and negative except for:  Constitution:  positive for fatigue  Behavioral/Psych: positive for  anxiety and depression    History    Past Medical History:   Diagnosis Date   • Depression    • Thyroid disease           Family History   Problem Relation Age of Onset   • Alcohol abuse Father    • Diabetes Paternal Aunt         Social History     Tobacco Use   • Smoking status: Never Smoker   • Smokeless tobacco: Never Used   Substance Use Topics   • Alcohol use: Not Currently     Alcohol/week: 1.0 standard drinks     Types: 1 Glasses of wine per week   • Drug use: Defer         "  Medications Prior to Admission   Medication Sig Dispense Refill Last Dose   • methIMAzole (TAPAZOLE) 10 MG tablet TAKE 1 TABLET BY MOUTH EVERY MORNING AND  2 tabs BEFORE SUPPER 270 tablet 1    • prenatal vitamin (prenatal, CLASSIC, vitamin) tablet Take  by mouth Daily.      • propranolol (INDERAL) 10 MG tablet Take one daily prn heart palpitations 30 tablet 4         Scheduled Meds:  docusate sodium, 100 mg, Oral, BID  methIMAzole, 10 mg, Oral, Daily  methIMAzole, 20 mg, Oral, Daily With Dinner  oxytocin, 999 mL/hr, Intravenous, Once  prenatal vitamin, 1 tablet, Oral, Daily         Continuous Infusions:  oxytocin, 125 mL/hr        PRN Meds:  •  benzocaine  •  bisacodyl  •  HYDROcodone-acetaminophen  •  Hydrocort-Pramoxine (Perianal)  •  ibuprofen  •  lanolin  •  magnesium hydroxide  •  Measles, Mumps & Rubella Vac  •  ondansetron  •  oxytocin **FOLLOWED BY** [] oxytocin **FOLLOWED BY** oxytocin  •  pramoxine-hydrocortisone  •  sodium chloride  •  witch hazel-glycerin      Allergies:  Patient has no known allergies.      Objective     Vital Signs   BP 99/57 (BP Location: Right arm, Patient Position: Lying)   Pulse 81   Temp 98.7 °F (37.1 °C) (Oral)   Resp 17   Ht 154.9 cm (61\")   Wt 55.2 kg (121 lb 11.1 oz)   LMP 06/15/2020 (Exact Date)   SpO2 98%   Breastfeeding No   BMI 22.99 kg/m²     Physical Exam:     General Appearance:    In NAD    Head:    Normocephalic, without obvious abnormality, atraumatic   Eyes:            Lids and lashes normal, conjunctivae and sclerae normal, no   icterus, no pallor, corneas clear, PERRLA   Skin:   No bleeding, bruising or rash          Neurologic:   Cranial nerves 2 - 12 grossly intact, sensation intact, DTR       present and equal bilaterally, gait steady, muscle tone within normal limits, no cogwheel rigidity       Mental Status Exam:   The patient was alert, oriented to person, place, time and sedation, she was casually dressed, cooperative, displayed good eye " "contact.  Speech normal rate and volume, normal prosody, normal rate, mood \"depressed \"affect was congruent with mood, normal range,  she was able to smile when appropriate,  Thought process organized and goal-directed, normal rate, associations logical, no looseness of associations, the patient did not bring up any delusional content for discussion, no elicited delusions, the patient denied any perceptual disturbances, she denied suicidal and homicidal ideations.  Memory 3 out of 3 immediate word recall, 3 out of 3 word recall in 5 minutes, concentration is fair, attention span within normal limits, insight and judgment good \"impulse control good , language appropriate, fund of knowledge appropriate.        Medications and allergies reviewed     Lab Results   Component Value Date    GLUCOSE 61 (L) 10/21/2020    CALCIUM 9.2 10/21/2020     10/21/2020    K 4.0 10/21/2020    CO2 22.6 10/21/2020     10/21/2020    BUN 8 10/21/2020    CREATININE 0.30 (L) 10/21/2020    EGFRIFNONA >150 10/21/2020    BCR 26.7 (H) 10/21/2020    ANIONGAP 9.4 10/21/2020       Last Urine Toxicity     LAST URINE TOXICITY RESULTS Latest Ref Rng & Units 3/10/2019    CREATININE UR mg/dL 238.7Urine creatinines <20 mg/dL may express a dilution effect which can cause false negatives for the drug screen.    AMPHETAMINES SCREEN, URINE NEGATIVE NEGATIVE    BARBITURATES SCREEN NEGATIVE NEGATIVE    BENZODIAZEPINE SCREEN, URINE NEGATIVE NEGATIVE    COCAINE SCREEN, URINE NEGATIVE NEGATIVE    METHADONE SCREEN, URINE NEGATIVE NEGATIVE          No results found for: PHENYTOIN, PHENOBARB, VALPROATE, CBMZ    Lab Results   Component Value Date     10/21/2020    BUN 8 10/21/2020    CREATININE 0.30 (L) 10/21/2020    TSH 12.840 (H) 02/26/2021    WBC 11.20 (H) 02/25/2021       Brief Urine Lab Results  (Last result in the past 365 days)      Color   Clarity   Blood   Leuk Est   Nitrite   Protein   CREAT   Urine HCG        11/08/20 2019 Yellow Clear " Moderate (2+) Negative Negative Negative               Assessment/Plan       , delivered       Assessment: Major depressive disorder, moderate recurrent with postpartum onset  Treatment Plan: The patient just delivered a healthy baby, the patient has moved social support, displayed normal bonding with the baby and her .  The patient did not display any psychotic symptoms, did not express SI/HI.  The patient is not going to breast-feed, start Lexapro 10 mg p.o. daily for couple of weeks with increase to 20 mg after that.  Discharge and safety plan was discussed with the patient and her , they verbalized understanding, follow-up with [facility], phone #[phone], the patient also will benefit from outpatient counseling, referred to \[facility]\"" or [facility] psychological services   The patient can be discharged when medically stable  Treatment Plan discussed with: Patient, Family and nursing     I discussed the patients findings and my recommendations with patient, family and nursing staff    I have reviewed and approved the behavioral health treatment plans and problem list. Yes  Thank you for the consult   Referring MD has access to consult report and progress notes in EMR     Martina Jarquin MD  21  14:59 EST

## 2021-02-27 NOTE — PAYOR COMM NOTE
"  [facility]  NPI # [id number]  TID # [id number]      RETURN CONTACT  RIP GRANADO RN [facility]   [place] /    PH: [phone]  FX: [phone]    ===========================  ATTENTION RN REVIEW=====================    REFERENCE # [id number]  ===========================  Mother and infant dc same day     DC NOTICE   ===========================  Please respond to above contact. Thank you.     ===========================    Ora Lynn (35 y.o. Female)     Date of Birth Social Security Number Address Home Phone MRN    1985  [address] [place] [address] [phone] [id number]    Druze Marital Status          None Single       Admission Date Admission Type Admitting Provider Attending Provider Department, Room/Bed    2/23/21 Elective Afua Moe MD  [facility] MOTHER BABY, M407/1    Discharge Date Discharge Disposition Discharge Destination        2/26/2021 Home or Self Care              Attending Provider: (none)   Allergies: No Known Allergies    Isolation: None   Infection: None   Code Status: Prior    Ht: 154.9 cm (61\")   Wt: 55.2 kg (121 lb 11.1 oz)    Admission Cmt: None   Principal Problem: None                Active Insurance as of 2/23/2021     Primary Coverage     Payor Plan Insurance Group Employer/Plan Group    [employer] [facility] [id number]     Payor Plan Address Payor Plan Phone Number Payor Plan Fax Number Effective Dates    MAIL STOP:   4/1/2019 - None Entered    [address] [address]       [facility] [address]       Subscriber Name Subscriber Birth Date Member ID       ORA LYNN 1985 [id number]                 Emergency Contacts      (Rel.) Home Phone Work Phone Mobile Phone    BRIGITTE SIGALA (Significant Other) -- -- [phone]            Operative/Procedure Notes (last 72 hours) (Notes from 02/24/21 1506 through 02/27/21 1506)    No notes of this type exist for this " encounter.            Discharge Summary      Moni Delcid, APRN at 21 0947          [facility]  Delivery Discharge Summary    Primary OB Clinician: Afua Moe MD    Admission Diagnosis:  Active Problems:    , delivered  36wk IUP  PTL  Prior c/s    Hyperthyroidism    Discharge Diagnosis:  Same, delivered,     Gestational Age: 36w0d    Date of Delivery: 2021     Delivered By:  Afua Moe     Delivery Type: , Spontaneous      Tubal Ligation: n/a    Intrapartum Course: Uncomplicated delivery.     Postpartum Course:  Pt was admitted and underwent  Spontaneous Vaginal Delivery. Pt was transferred to PP where she had an uncomplicated course. Pt remained AFVSS, had scant lochia and pain was well controlled. Pt d/c home in stable condition and will f/u in office for PP visit as scheduled or PRN. Currently bottle feeding. Plans on condoms  for contraception. Pt feels overwhelmed, sad, and anxious about going home with baby. States was supposed to have c/s but delivered  at 36wks. Pt worried baby won't take the bottle for her when she gets home. Has hx of depression/anxiety. Pt desires to restart Lexapro. Was not on SSRI during pregnancy.  and psych consult appreciated.     Physical Exam:    Vitals:   Vitals:    21 0700 21 1500 21 2246 21 0700   BP: 96/50 99/61 101/60 99/57   BP Location: Right arm Right arm Right arm Right arm   Patient Position: Lying Lying Lying Lying   Pulse: 78 79 81 81   Resp: 16 16 16 17   Temp: 98.7 °F (37.1 °C) 98.9 °F (37.2 °C) 98.6 °F (37 °C) 98.7 °F (37.1 °C)   TempSrc: Oral Oral Oral Oral   SpO2: 99% 98% 99% 98%   Weight:       Height:         Temp (24hrs), Av.7 °F (37.1 °C), Min:98.6 °F (37 °C), Max:98.9 °F (37.2 °C)      General Appearance:    Alert, cooperative, in no acute distress   Abdomen:     Soft non-tender, non-distended, no guarding, no rebound         tenderness.   Extremities:   Moves all  extremities well, no edema, no cyanosis, no              Redness.   Incision:   n/a   Fundus:   Firm, below umbilicus     Feeding method: Breastfeeding Status: No    Labs:  Results from last 7 days   Lab Units 02/25/21  0553 02/23/21  2311   WBC 10*3/mm3 11.20* 13.50*   HEMOGLOBIN g/dL 9.5* 12.4   HEMATOCRIT % 27.2* 36.0   PLATELETS 10*3/mm3 169 244           Blood Type: RH Negative Rhogam Given       Plan:  Discharge to home.     and psych consult prior to d/c  Lexapro 10mg daily  Followup with endocrine postpartum  Follow-up appointment with Dr Moe in 6 weeks and in 2 weeks.    REGIS Dumas  2/26/2021  09:47 EST      /d    Electronically signed by Moni Delcid APRN at 02/26/21 0955

## 2021-02-27 NOTE — PROGRESS NOTES
Case Management Discharge Note           Provided Post Acute Provider List?: N/A  Provided Post Acute Provider Quality & Resource List?: N/A    Selected Continued Care - Discharged on 2/26/2021 Admission date: 2/23/2021 - Discharge disposition: Home or Self Care    Destination    No services have been selected for the patient.              Durable Medical Equipment    No services have been selected for the patient.              Dialysis/Infusion    No services have been selected for the patient.              Home Medical Care    No services have been selected for the patient.              Therapy    No services have been selected for the patient.              Community Resources    No services have been selected for the patient.

## 2021-03-02 DIAGNOSIS — E05.90 HYPERTHYROIDISM: Primary | ICD-10-CM

## 2021-03-03 NOTE — PROGRESS NOTES
Called & talked to pt.   Thyroid labs done on 2/26, day of discharge ( she delivered on 2/24 ), showed that she is hypothyroid now.  Will stop methimazole & recheck labs in 2 weeks.

## 2021-03-16 ENCOUNTER — LAB (OUTPATIENT)
Dept: LAB | Facility: HOSPITAL | Age: 36
End: 2021-03-16

## 2021-03-16 DIAGNOSIS — E05.90 HYPERTHYROIDISM: ICD-10-CM

## 2021-03-16 LAB
T4 FREE SERPL-MCNC: 1.41 NG/DL (ref 0.93–1.7)
TSH SERPL DL<=0.05 MIU/L-ACNC: 0.05 UIU/ML (ref 0.27–4.2)

## 2021-03-16 PROCEDURE — 84439 ASSAY OF FREE THYROXINE: CPT

## 2021-03-16 PROCEDURE — 84443 ASSAY THYROID STIM HORMONE: CPT

## 2021-03-16 PROCEDURE — 36415 COLL VENOUS BLD VENIPUNCTURE: CPT

## 2021-03-19 DIAGNOSIS — E05.90 HYPERTHYROIDISM: Primary | ICD-10-CM

## 2021-04-30 ENCOUNTER — E-VISIT (OUTPATIENT)
Dept: FAMILY MEDICINE CLINIC | Facility: TELEHEALTH | Age: 36
End: 2021-04-30

## 2021-04-30 DIAGNOSIS — B37.9 ANTIBIOTIC-INDUCED YEAST INFECTION: ICD-10-CM

## 2021-04-30 DIAGNOSIS — T36.95XA ANTIBIOTIC-INDUCED YEAST INFECTION: ICD-10-CM

## 2021-04-30 DIAGNOSIS — R39.89 SUSPECTED UTI: Primary | ICD-10-CM

## 2021-04-30 PROCEDURE — 99213 OFFICE O/P EST LOW 20 MIN: CPT | Performed by: NURSE PRACTITIONER

## 2021-04-30 RX ORDER — NITROFURANTOIN 25; 75 MG/1; MG/1
100 CAPSULE ORAL 2 TIMES DAILY
Qty: 14 CAPSULE | Refills: 0 | Status: SHIPPED | OUTPATIENT
Start: 2021-04-30 | End: 2021-05-03

## 2021-04-30 RX ORDER — PHENAZOPYRIDINE HYDROCHLORIDE 200 MG/1
200 TABLET, FILM COATED ORAL 3 TIMES DAILY PRN
Qty: 6 TABLET | Refills: 0 | Status: SHIPPED | OUTPATIENT
Start: 2021-04-30 | End: 2021-05-02

## 2021-04-30 RX ORDER — FLUCONAZOLE 150 MG/1
TABLET ORAL
Qty: 2 TABLET | Refills: 0 | Status: SHIPPED | OUTPATIENT
Start: 2021-04-30 | End: 2021-05-03

## 2021-04-30 NOTE — PROGRESS NOTES
Ora Lynn    1985  [id number]    I have reviewed the e-Visit questionnaire and patient's answers, my assessment and plan are as follows:      HPI  Ora Lynn is a 36 y.o. with a 1-4 day history of dysuria described as dull pain, frequency, urgency.  She denies fever/chills, nausea/vomiting, belly/back pain, hematuria, or vaginal symptoms.  She does request medication for vaginal yeast infection induced by antibiotic.  She has had similar symptoms in the past which resolved with antibiotic treatment    Review of Systems - Negative except symptoms listed in HPI      Diagnoses and all orders for this visit:    1. Suspected UTI (Primary)  -     nitrofurantoin, macrocrystal-monohydrate, (MACROBID) 100 MG capsule; Take 1 capsule by mouth 2 (Two) Times a Day for 7 days.  Dispense: 14 capsule; Refill: 0  -     phenazopyridine (PYRIDIUM) 200 MG tablet; Take 1 tablet by mouth 3 (Three) Times a Day As Needed for Bladder Spasms for up to 2 days.  Dispense: 6 tablet; Refill: 0    2. Antibiotic-induced yeast infection  -     fluconazole (DIFLUCAN) 150 MG tablet; Take 1 tablet now, repeat in 72 hours if symptoms continue  Dispense: 2 tablet; Refill: 0    -Macrobid as prescribed - complete entire course of medication even if you begin to feel better.   --Phenazopyridine is for painful urination and bladder spasms--this medication with cause urine to become bright orange and can stain undergarments.  --Fluconazole is an antifungal to treat yeast infection; take as prescribed    -Continue to increase your fluid intake.   -Abstain from intercourse during antibiotic treatment.   -Practice good perineal hygiene: wipe front to back  -Do not hold your urine- go to the bathroom every 2-3 hours.     -Warning signs: severe abdominal/pelvic/back pain, fever >101, blood in urine - seek medical attention as soon as possible for a hands on/objective exam and possible labs.     -Follow up with your PCP in 2 days if no improvement  in symptoms or if symptoms begin to worsen.       Any medications prescribed have been sent electronically to   [id number] - [place], [place] - [address] - [phone] PH - [phone] FX  [address] [facility] [place] [address]  Phone: [phone] Fax: [phone]    [id number] - [facility] [place] - [address]  - [phone]  - [phone] FX  [address]  [place] [address]  Phone: [phone] Fax: [phone]      Time Documentation  Counseled patient  Counseling topics: diagnosis, treatment options and return instructions  Total encounter time: counseling time more than 50% of visit: 20 minutes        Hanane Orellana, REGIS  04/30/21  13:50 EDT

## 2021-04-30 NOTE — PATIENT INSTRUCTIONS
Urinary Tract Infection, Adult    A urinary tract infection (UTI) is an infection of any part of the urinary tract. The urinary tract includes the kidneys, ureters, bladder, and urethra. These organs make, store, and get rid of urine in the body.  Your health care provider may use other names to describe the infection. An upper UTI affects the ureters and kidneys (pyelonephritis). A lower UTI affects the bladder (cystitis) and urethra (urethritis).  What are the causes?  Most urinary tract infections are caused by bacteria in your genital area, around the entrance to your urinary tract (urethra). These bacteria grow and cause inflammation of your urinary tract.  What increases the risk?  You are more likely to develop this condition if:  · You have a urinary catheter that stays in place (indwelling).  · You are not able to control when you urinate or have a bowel movement (you have incontinence).  · You are female and you:  ? Use a spermicide or diaphragm for birth control.  ? Have low estrogen levels.  ? Are pregnant.  · You have certain genes that increase your risk (genetics).  · You are sexually active.  · You take antibiotic medicines.  · You have a condition that causes your flow of urine to slow down, such as:  ? An enlarged prostate, if you are male.  ? Blockage in your urethra (stricture).  ? A kidney stone.  ? A nerve condition that affects your bladder control (neurogenic bladder).  ? Not getting enough to drink, or not urinating often.  · You have certain medical conditions, such as:  ? Diabetes.  ? A weak disease-fighting system (immunesystem).  ? Sickle cell disease.  ? Gout.  ? Spinal cord injury.  What are the signs or symptoms?  Symptoms of this condition include:  · Needing to urinate right away (urgently).  · Frequent urination or passing small amounts of urine frequently.  · Pain or burning with urination.  · Blood in the urine.  · Urine that smells bad or unusual.  · Trouble urinating.  · Cloudy  urine.  · Vaginal discharge, if you are female.  · Pain in the abdomen or the lower back.  You may also have:  · Vomiting or a decreased appetite.  · Confusion.  · Irritability or tiredness.  · A fever.  · Diarrhea.  The first symptom in older adults may be confusion. In some cases, they may not have any symptoms until the infection has worsened.  How is this diagnosed?  This condition is diagnosed based on your medical history and a physical exam. You may also have other tests, including:  · Urine tests.  · Blood tests.  · Tests for sexually transmitted infections (STIs).  If you have had more than one UTI, a cystoscopy or imaging studies may be done to determine the cause of the infections.  How is this treated?  Treatment for this condition includes:  · Antibiotic medicine.  · Over-the-counter medicines to treat discomfort.  · Drinking enough water to stay hydrated.  If you have frequent infections or have other conditions such as a kidney stone, you may need to see a health care provider who specializes in the urinary tract (urologist).  In rare cases, urinary tract infections can cause sepsis. Sepsis is a life-threatening condition that occurs when the body responds to an infection. Sepsis is treated in the hospital with IV antibiotics, fluids, and other medicines.  Follow these instructions at home:    Medicines  · Take over-the-counter and prescription medicines only as told by your health care provider.  · If you were prescribed an antibiotic medicine, take it as told by your health care provider. Do not stop using the antibiotic even if you start to feel better.  General instructions  · Make sure you:  ? Empty your bladder often and completely. Do not hold urine for long periods of time.  ? Empty your bladder after sex.  ? Wipe from front to back after a bowel movement if you are female. Use each tissue one time when you wipe.  · Drink enough fluid to keep your urine pale yellow.  · Keep all follow-up  visits as told by your health care provider. This is important.  Contact a health care provider if:  · Your symptoms do not get better after 1-2 days.  · Your symptoms go away and then return.  Get help right away if you have:  · Severe pain in your back or your lower abdomen.  · A fever.  · Nausea or vomiting.  Summary  · A urinary tract infection (UTI) is an infection of any part of the urinary tract, which includes the kidneys, ureters, bladder, and urethra.  · Most urinary tract infections are caused by bacteria in your genital area, around the entrance to your urinary tract (urethra).  · Treatment for this condition often includes antibiotic medicines.  · If you were prescribed an antibiotic medicine, take it as told by your health care provider. Do not stop using the antibiotic even if you start to feel better.  · Keep all follow-up visits as told by your health care provider. This is important.  This information is not intended to replace advice given to you by your health care provider. Make sure you discuss any questions you have with your health care provider.  Document Revised: 12/05/2019 Document Reviewed: 06/27/2019  [employer] Patient Education © 2021 [employer]    Vaginal Yeast Infection, Adult    Vaginal yeast infection is a condition that causes vaginal discharge as well as soreness, swelling, and redness (inflammation) of the vagina. This is a common condition. Some women get this infection frequently.  What are the causes?  This condition is caused by a change in the normal balance of the yeast (candida) and bacteria that live in the vagina. This change causes an overgrowth of yeast, which causes the inflammation.  What increases the risk?  The condition is more likely to develop in women who:  · Take antibiotic medicines.  · Have diabetes.  · Take birth control pills.  · Are pregnant.  · Douche often.  · Have a weak body defense system (immune system).  · Have been taking steroid medicines for a  long time.  · Frequently wear tight clothing.  What are the signs or symptoms?  Symptoms of this condition include:  · White, thick, creamy vaginal discharge.  · Swelling, itching, redness, and irritation of the vagina. The lips of the vagina (vulva) may be affected as well.  · Pain or a burning feeling while urinating.  · Pain during sex.  How is this diagnosed?  This condition is diagnosed based on:  · Your medical history.  · A physical exam.  · A pelvic exam. Your health care provider will examine a sample of your vaginal discharge under a microscope. Your health care provider may send this sample for testing to confirm the diagnosis.  How is this treated?  This condition is treated with medicine. Medicines may be over-the-counter or prescription. You may be told to use one or more of the following:  · Medicine that is taken by mouth (orally).  · Medicine that is applied as a cream (topically).  · Medicine that is inserted directly into the vagina (suppository).  Follow these instructions at home:    Lifestyle  · Do not have sex until your health care provider approves. Tell your sex partner that you have a yeast infection. That person should go to his or her health care provider and ask if they should also be treated.  · Do not wear tight clothes, such as pantyhose or tight pants.  · Wear breathable cotton underwear.  General instructions  · Take or apply over-the-counter and prescription medicines only as told by your health care provider.  · Eat more yogurt. This may help to keep your yeast infection from returning.  · Do not use tampons until your health care provider approves.  · Try taking a sitz bath to help with discomfort. This is a warm water bath that is taken while you are sitting down. The water should only come up to your hips and should cover your buttocks. Do this 3-4 times per day or as told by your health care provider.  · Do not douche.  · If you have diabetes, keep your blood sugar levels  under control.  · Keep all follow-up visits as told by your health care provider. This is important.  Contact a health care provider if:  · You have a fever.  · Your symptoms go away and then return.  · Your symptoms do not get better with treatment.  · Your symptoms get worse.  · You have new symptoms.  · You develop blisters in or around your vagina.  · You have blood coming from your vagina and it is not your menstrual period.  · You develop pain in your abdomen.  Summary  · Vaginal yeast infection is a condition that causes discharge as well as soreness, swelling, and redness (inflammation) of the vagina.  · This condition is treated with medicine. Medicines may be over-the-counter or prescription.  · Take or apply over-the-counter and prescription medicines only as told by your health care provider.  · Do not douche. Do not have sex or use tampons until your health care provider approves.  · Contact a health care provider if your symptoms do not get better with treatment or your symptoms go away and then return.  This information is not intended to replace advice given to you by your health care provider. Make sure you discuss any questions you have with your health care provider.  Document Revised: 07/17/2020 Document Reviewed: 05/06/2019  [employer]vier Patient Education © 2021 [employer]

## 2021-05-03 ENCOUNTER — TELEMEDICINE (OUTPATIENT)
Dept: FAMILY MEDICINE CLINIC | Facility: TELEHEALTH | Age: 36
End: 2021-05-03

## 2021-05-03 DIAGNOSIS — R39.89 SUSPECTED UTI: Primary | ICD-10-CM

## 2021-05-03 PROBLEM — K21.9 GASTROESOPHAGEAL REFLUX DISEASE: Status: RESOLVED | Noted: 2017-09-07 | Resolved: 2021-05-03

## 2021-05-03 PROCEDURE — 99213 OFFICE O/P EST LOW 20 MIN: CPT | Performed by: NURSE PRACTITIONER

## 2021-05-03 RX ORDER — PHENAZOPYRIDINE HYDROCHLORIDE 200 MG/1
200 TABLET, FILM COATED ORAL 3 TIMES DAILY PRN
Qty: 6 TABLET | Refills: 0 | Status: SHIPPED | OUTPATIENT
Start: 2021-05-03 | End: 2021-05-05

## 2021-05-03 RX ORDER — NORETHINDRONE ACETATE AND ETHINYL ESTRADIOL AND FERROUS FUMARATE 1MG-20(21)
1 KIT ORAL DAILY
COMMUNITY
Start: 2021-04-14

## 2021-05-03 RX ORDER — SULFAMETHOXAZOLE AND TRIMETHOPRIM 800; 160 MG/1; MG/1
1 TABLET ORAL 2 TIMES DAILY
Qty: 6 TABLET | Refills: 0 | Status: SHIPPED | OUTPATIENT
Start: 2021-05-03 | End: 2021-05-06

## 2021-05-03 NOTE — PROGRESS NOTES
CHIEF COMPLAINT  Chief Complaint   Patient presents with   • Urinary Tract Infection         HPI  Ora Lynn is a 36 y.o. female  presents with complaint of urinary tract infection symptoms. She has had them for 24 hours and they are worsening. She prefers Cipro or Bactrim DS over Macrobid as this has caused nausea and vomiting in the past.     Review of Systems   Constitutional: Negative for chills, diaphoresis and fatigue.   Genitourinary: Positive for dysuria, frequency and urgency. Negative for decreased urine volume, flank pain, genital sores, hematuria, menstrual problem, pelvic pain, vaginal bleeding, vaginal discharge and vaginal pain.       Past Medical History:   Diagnosis Date   • Depression    • Thyroid disease        Family History   Problem Relation Age of Onset   • Alcohol abuse Father    • Diabetes Paternal Aunt        Social History     Socioeconomic History   • Marital status: Single     Spouse name: Not on file   • Number of children: Not on file   • Years of education: Not on file   • Highest education level: Not on file   Tobacco Use   • Smoking status: Never Smoker   • Smokeless tobacco: Never Used   Substance and Sexual Activity   • Alcohol use: Not Currently     Alcohol/week: 1.0 standard drinks     Types: 1 Glasses of wine per week   • Drug use: Defer   • Sexual activity: Not Currently     Partners: Male         LMP 04/14/2021 (Approximate)   Breastfeeding No     PHYSICAL EXAM  Physical Exam   Constitutional: She is oriented to person, place, and time. She appears well-developed and well-nourished. She does not have a sickly appearance. She does not appear ill. No distress.   HENT:   Head: Normocephalic and atraumatic.   Eyes: EOM are normal.   Neck: Neck normal appearance.  Pulmonary/Chest: Effort normal.  No respiratory distress.  Neurological: She is alert and oriented to person, place, and time.   Skin: Skin is dry.   Psychiatric: She has a normal mood and affect.       Results for  orders placed or performed in visit on 03/16/21   TSH    Specimen: Blood   Result Value Ref Range    TSH 0.052 (L) 0.270 - 4.200 uIU/mL   T4, Free    Specimen: Blood   Result Value Ref Range    Free T4 1.41 0.93 - 1.70 ng/dL       Diagnoses and all orders for this visit:    1. Suspected UTI (Primary)    Other orders  -     sulfamethoxazole-trimethoprim (Bactrim DS) 800-160 MG per tablet; Take 1 tablet by mouth 2 (Two) Times a Day for 3 days.  Dispense: 6 tablet; Refill: 0  -     phenazopyridine (Pyridium) 200 MG tablet; Take 1 tablet by mouth 3 (Three) Times a Day As Needed for Bladder Spasms for up to 2 days.  Dispense: 6 tablet; Refill: 0          FOLLOW-UP  As discussed during visit with PCP/[facility] if no improvement or Urgent Care/Emergency Department if worsening of symptoms    Patient verbalizes understanding of medication dosage, comfort measures, instructions for treatment and follow-up.    REGIS Lincoln  05/03/2021  15:26 EDT    This visit was performed via Telehealth.  This patient has been instructed to follow-up with their primary care provider if their symptoms worsen or the treatment provided does not resolve their illness.

## 2021-05-03 NOTE — PATIENT INSTRUCTIONS
Drink plenty of fluids   Stay away from caffeine  If symptoms do not improve in 3-5 days follow up with your primary care provider or urgent care for an in person evaluation.     Urinary Tract Infection, Adult  A urinary tract infection (UTI) is an infection of any part of the urinary tract. The urinary tract includes:  · The kidneys.  · The ureters.  · The bladder.  · The urethra.  These organs make, store, and get rid of pee (urine) in the body.  What are the causes?  This is caused by germs (bacteria) in your genital area. These germs grow and cause swelling (inflammation) of your urinary tract.  What increases the risk?  You are more likely to develop this condition if:  · You have a small, thin tube (catheter) to drain pee.  · You cannot control when you pee or poop (incontinence).  · You are female, and:  ? You use these methods to prevent pregnancy:  § A medicine that kills sperm (spermicide).  § A device that blocks sperm (diaphragm).  ? You have low levels of a female hormone (estrogen).  ? You are pregnant.  · You have genes that add to your risk.  · You are sexually active.  · You take antibiotic medicines.  · You have trouble peeing because of:  ? A prostate that is bigger than normal, if you are male.  ? A blockage in the part of your body that drains pee from the bladder (urethra).  ? A kidney stone.  ? A nerve condition that affects your bladder (neurogenic bladder).  ? Not getting enough to drink.  ? Not peeing often enough.  · You have other conditions, such as:  ? Diabetes.  ? A weak disease-fighting system (immune system).  ? Sickle cell disease.  ? Gout.  ? Injury of the spine.  What are the signs or symptoms?  Symptoms of this condition include:  · Needing to pee right away (urgently).  · Peeing often.  · Peeing small amounts often.  · Pain or burning when peeing.  · Blood in the pee.  · Pee that smells bad or not like normal.  · Trouble peeing.  · Pee that is cloudy.  · Fluid coming from the  vagina, if you are female.  · Pain in the belly or lower back.  Other symptoms include:  · Throwing up (vomiting).  · No urge to eat.  · Feeling mixed up (confused).  · Being tired and grouchy (irritable).  · A fever.  · Watery poop (diarrhea).  How is this treated?  This condition may be treated with:  · Antibiotic medicine.  · Other medicines.  · Drinking enough water.  Follow these instructions at home:    Medicines  · Take over-the-counter and prescription medicines only as told by your doctor.  · If you were prescribed an antibiotic medicine, take it as told by your doctor. Do not stop taking it even if you start to feel better.  General instructions  · Make sure you:  ? Pee until your bladder is empty.  ? Do not hold pee for a long time.  ? Empty your bladder after sex.  ? Wipe from front to back after pooping if you are a female. Use each tissue one time when you wipe.  · Drink enough fluid to keep your pee pale yellow.  · Keep all follow-up visits as told by your doctor. This is important.  Contact a doctor if:  · You do not get better after 1-2 days.  · Your symptoms go away and then come back.  Get help right away if:  · You have very bad back pain.  · You have very bad pain in your lower belly.  · You have a fever.  · You are sick to your stomach (nauseous).  · You are throwing up.  Summary  · A urinary tract infection (UTI) is an infection of any part of the urinary tract.  · This condition is caused by germs in your genital area.  · There are many risk factors for a UTI. These include having a small, thin tube to drain pee and not being able to control when you pee or poop.  · Treatment includes antibiotic medicines for germs.  · Drink enough fluid to keep your pee pale yellow.  This information is not intended to replace advice given to you by your health care provider. Make sure you discuss any questions you have with your health care provider.  Document Revised: 12/05/2019 Document Reviewed:  06/27/2019  Elsevier Patient Education © 2021 [employer]

## 2021-05-19 DIAGNOSIS — E05.90 HYPERTHYROIDISM: ICD-10-CM

## 2021-05-19 RX ORDER — PROPRANOLOL HYDROCHLORIDE 10 MG/1
TABLET ORAL
Qty: 180 TABLET | Refills: 3 | Status: SHIPPED | OUTPATIENT
Start: 2021-05-19 | End: 2021-06-07 | Stop reason: SDUPTHER

## 2021-05-28 ENCOUNTER — TELEPHONE (OUTPATIENT)
Dept: ENDOCRINOLOGY | Facility: CLINIC | Age: 36
End: 2021-05-28

## 2021-05-28 NOTE — TELEPHONE ENCOUNTER
Sure. She is already scheduled for labs on 6/7 & for my appt on 6/14.  She can get labs done sooner if she is desperate.

## 2021-05-28 NOTE — TELEPHONE ENCOUNTER
Patient asking if she can get thyroid labs checked. States has given birth but is having palpitations/high heart rate, has dropped a lot of weight and feels shaky.

## 2021-06-03 ENCOUNTER — LAB (OUTPATIENT)
Dept: LAB | Facility: HOSPITAL | Age: 36
End: 2021-06-03

## 2021-06-03 DIAGNOSIS — E05.90 HYPERTHYROIDISM: ICD-10-CM

## 2021-06-03 LAB
T4 FREE SERPL-MCNC: >7.77 NG/DL (ref 0.93–1.7)
TSH SERPL DL<=0.05 MIU/L-ACNC: <0.005 UIU/ML (ref 0.27–4.2)

## 2021-06-03 PROCEDURE — 36415 COLL VENOUS BLD VENIPUNCTURE: CPT

## 2021-06-03 PROCEDURE — 84443 ASSAY THYROID STIM HORMONE: CPT

## 2021-06-03 PROCEDURE — 84439 ASSAY OF FREE THYROXINE: CPT

## 2021-06-04 LAB — HBA1C MFR BLD: 10.7 %

## 2021-06-06 RX ORDER — METHIMAZOLE 10 MG/1
TABLET ORAL
Start: 2021-06-06 | End: 2021-06-14

## 2021-06-07 DIAGNOSIS — E05.90 HYPERTHYROIDISM: ICD-10-CM

## 2021-06-07 RX ORDER — PROPRANOLOL HYDROCHLORIDE 10 MG/1
TABLET ORAL
Qty: 180 TABLET | Refills: 3 | Status: SHIPPED | OUTPATIENT
Start: 2021-06-07 | End: 2021-06-14

## 2021-06-11 RX ORDER — FLUCONAZOLE 150 MG/1
TABLET ORAL
COMMUNITY
Start: 2021-06-04 | End: 2021-06-14

## 2021-06-14 ENCOUNTER — OFFICE VISIT (OUTPATIENT)
Dept: ENDOCRINOLOGY | Facility: CLINIC | Age: 36
End: 2021-06-14

## 2021-06-14 VITALS
OXYGEN SATURATION: 99 % | SYSTOLIC BLOOD PRESSURE: 106 MMHG | HEART RATE: 125 BPM | WEIGHT: 102 LBS | DIASTOLIC BLOOD PRESSURE: 70 MMHG | BODY MASS INDEX: 19.26 KG/M2 | HEIGHT: 61 IN | TEMPERATURE: 96.9 F

## 2021-06-14 DIAGNOSIS — Z51.81 ENCOUNTER FOR MEDICATION MONITORING: ICD-10-CM

## 2021-06-14 DIAGNOSIS — E05.90 HYPERTHYROIDISM: Primary | ICD-10-CM

## 2021-06-14 PROCEDURE — 99213 OFFICE O/P EST LOW 20 MIN: CPT | Performed by: INTERNAL MEDICINE

## 2021-06-14 RX ORDER — METHIMAZOLE 10 MG/1
TABLET ORAL
Start: 2021-06-14 | End: 2021-12-15 | Stop reason: SDUPTHER

## 2021-06-14 RX ORDER — PROPRANOLOL HYDROCHLORIDE 10 MG/1
TABLET ORAL
Start: 2021-06-14 | End: 2021-12-15 | Stop reason: SDUPTHER

## 2021-06-14 RX ORDER — LEVONORGESTREL AND ETHINYL ESTRADIOL 0.1-0.02MG
KIT ORAL
COMMUNITY
Start: 2021-06-07 | End: 2021-06-14

## 2021-06-14 NOTE — PATIENT INSTRUCTIONS
Increase methimazole & propranolol to 2 tabs 2x/d.  Get labs rechecked in 2 weeks.   Will call you with the lab results.  F/u in 6 months, with labs prior.

## 2021-06-14 NOTE — PROGRESS NOTES
[facility]        Patient Care Team:  Edna Francois APRN as PCP - General (Family Medicine)  Afua Moe MD as Consulting Physician (Obstetrics and Gynecology)    Chief Complaint:    Chief Complaint   Patient presents with   • Hyperthyroidism         Subjective     Here for thyroid f/u  Delivered on February 24th @ 36 weeks, 5 lb baby  Did not breast feed  Post delivery thyroid labs: normal (off methimazole x 2wks)  Restarted on methimazole last week after thyrotoxic labs   Exercise program: active    Interval History:     Patient Complaints: palpitations, jittery  Patient Denies: bloating  History taken from: patient    Review of Systems:   Review of Systems   Constitutional: Positive for fatigue and unexpected weight loss.   HENT: Negative for trouble swallowing.    Eyes: Negative for blurred vision.   Cardiovascular: Positive for palpitations.   Gastrointestinal: Negative for nausea.   Endocrine: Positive for heat intolerance. Negative for polyuria.   Neurological: Positive for tremors. Negative for headache.   Psychiatric/Behavioral: The patient is nervous/anxious.      Lost  18 lb since last visit  Objective     Vital Signs  Temp:  [96.9 °F (36.1 °C)] 96.9 °F (36.1 °C)  Heart Rate:  [125] 125  BP: (106)/(70) 106/70    Physical Exam:     General Appearance:    Alert, cooperative, in no acute distress   Head:    Normocephalic, without obvious abnormality, atraumatic   Eyes:       Mouth:  Neck:       No periorbital edema. No lid lag    No lesions    32.5 cm in circumference, thyroid soft   Lungs:     Clear     Heart:    Regular rhythm and normal rate   Abdomen:     Normal bowel sounds, soft                 Extremities:   Moves all extremities well, no edema. Fine hand tremors               Pulses:   Pulses palpable and equal bilaterally   Skin:   No bruising or rash   Neurologic:  DTR 1+          Results Review:    I have reviewed the patient's new clinical results, labs &  imaging.    Medication Review:   Prior to Admission medications    Medication Sig Start Date End Date Taking? Authorizing Provider   ibuprofen (ADVIL,MOTRIN) 600 MG tablet Take 1 tablet by mouth Every 6 (Six) Hours As Needed for Mild Pain . 2/26/21  Yes Moni Delcid, REGIS   Junel FE 1/20 1-20 MG-MCG per tablet Take 1 tablet by mouth Daily. 4/14/21  Yes Stefano Foster MD   methIMAzole (TAPAZOLE) 10 MG tablet Take 2 tabs in am, 2 tabs in pm. 6/14/21  Yes Ismael Monsivais MD   propranolol (INDERAL) 10 MG tablet TAKE 1 TABLET BY MOUTH EVERY MORNIING AND 2 tabs BEFORE SUPPER 6/14/21  Yes Ismael Monsivais MD   sertraline (ZOLOFT) 50 MG tablet TAKE 1/2 TABLET BY MOUTH EVERY DAY X 4 7 DAYS THEN INCREASE TO TAKE 1 TABLET EVERY DAY 4/14/21  Yes Stefano Foster MD   methIMAzole (TAPAZOLE) 10 MG tablet Take one tab in am, 2 tabs in pm. 6/6/21 6/14/21 Yes Ismael Monsivais MD   propranolol (INDERAL) 10 MG tablet TAKE 1 TABLET BY MOUTH EVERY MORNIING AND BEFORE SUPPER 6/7/21 6/14/21 Yes Ismael Monsivais MD   fluconazole (DIFLUCAN) 150 MG tablet  6/4/21 6/14/21  Stefano Foster MD   [place] 0.1-20 MG-MCG per tablet  6/7/21 6/14/21  Stefano Foster MD       Lab Results (most recent)     None        Lab Results   Component Value Date    HGBA1C 10.7 06/02/2021      Lab Results   Component Value Date    GLUCOSE 61 (L) 10/21/2020    BUN 8 10/21/2020    CREATININE 0.30 (L) 10/21/2020    EGFRIFNONA >150 10/21/2020    BCR 26.7 (H) 10/21/2020    K 4.0 10/21/2020    CO2 22.6 10/21/2020    CALCIUM 9.2 10/21/2020    ALBUMIN 3.50 10/21/2020    LABIL2 0.9 (L) 03/10/2019    AST 13 10/21/2020    ALT 13 10/21/2020     Lab Results   Component Value Date    TSH <0.005 (L) 06/03/2021    FREET4 >7.77 (H) 06/03/2021       Assessment/Plan     Diagnoses and all orders for this visit:    1. Hyperthyroidism (Primary)  -     propranolol (INDERAL) 10 MG tablet; TAKE 1 TABLET BY MOUTH EVERY MORNIING AND 2 tabs BEFORE  SUPPER  -     methIMAzole (TAPAZOLE) 10 MG tablet; Take 2 tabs in am, 2 tabs in pm.  -     TSH; Future  -     T4, Free; Future    2. Encounter for medication monitoring  -     CBC & Differential; Future  -     Comprehensive Metabolic Panel; Future    Thyroid worse.    Increase methimazole & propranolol to 2 tabs 2x/d.  Get labs rechecked in 2 weeks.   Will call you with the lab results.        Ismael Monsivais MD  06/14/21  18:44 EDT

## 2021-06-28 ENCOUNTER — LAB (OUTPATIENT)
Dept: LAB | Facility: HOSPITAL | Age: 36
End: 2021-06-28

## 2021-06-28 DIAGNOSIS — E05.90 HYPERTHYROIDISM: ICD-10-CM

## 2021-06-28 DIAGNOSIS — Z51.81 ENCOUNTER FOR MEDICATION MONITORING: ICD-10-CM

## 2021-06-28 LAB
ALBUMIN SERPL-MCNC: 3.8 G/DL (ref 3.5–5.2)
ALBUMIN/GLOB SERPL: 1.5 G/DL
ALP SERPL-CCNC: 61 U/L (ref 39–117)
ALT SERPL W P-5'-P-CCNC: 36 U/L (ref 1–33)
ANION GAP SERPL CALCULATED.3IONS-SCNC: 8.3 MMOL/L (ref 5–15)
AST SERPL-CCNC: 24 U/L (ref 1–32)
BASOPHILS # BLD AUTO: 0.02 10*3/MM3 (ref 0–0.2)
BASOPHILS NFR BLD AUTO: 0.4 % (ref 0–1.5)
BILIRUB SERPL-MCNC: 0.3 MG/DL (ref 0–1.2)
BUN SERPL-MCNC: 12 MG/DL (ref 6–20)
BUN/CREAT SERPL: 29.3 (ref 7–25)
CALCIUM SPEC-SCNC: 9.2 MG/DL (ref 8.6–10.5)
CHLORIDE SERPL-SCNC: 106 MMOL/L (ref 98–107)
CO2 SERPL-SCNC: 25.7 MMOL/L (ref 22–29)
CREAT SERPL-MCNC: 0.41 MG/DL (ref 0.57–1)
DEPRECATED RDW RBC AUTO: 33.8 FL (ref 37–54)
EOSINOPHIL # BLD AUTO: 0.04 10*3/MM3 (ref 0–0.4)
EOSINOPHIL NFR BLD AUTO: 0.8 % (ref 0.3–6.2)
ERYTHROCYTE [DISTWIDTH] IN BLOOD BY AUTOMATED COUNT: 11.9 % (ref 12.3–15.4)
GFR SERPL CREATININE-BSD FRML MDRD: >150 ML/MIN/1.73
GLOBULIN UR ELPH-MCNC: 2.6 GM/DL
GLUCOSE SERPL-MCNC: 125 MG/DL (ref 65–99)
HCT VFR BLD AUTO: 33.9 % (ref 34–46.6)
HGB BLD-MCNC: 11.7 G/DL (ref 12–15.9)
IMM GRANULOCYTES # BLD AUTO: 0.01 10*3/MM3 (ref 0–0.05)
IMM GRANULOCYTES NFR BLD AUTO: 0.2 % (ref 0–0.5)
LYMPHOCYTES # BLD AUTO: 2.53 10*3/MM3 (ref 0.7–3.1)
LYMPHOCYTES NFR BLD AUTO: 47.8 % (ref 19.6–45.3)
MCH RBC QN AUTO: 27.7 PG (ref 26.6–33)
MCHC RBC AUTO-ENTMCNC: 34.5 G/DL (ref 31.5–35.7)
MCV RBC AUTO: 80.1 FL (ref 79–97)
MONOCYTES # BLD AUTO: 0.55 10*3/MM3 (ref 0.1–0.9)
MONOCYTES NFR BLD AUTO: 10.4 % (ref 5–12)
NEUTROPHILS NFR BLD AUTO: 2.14 10*3/MM3 (ref 1.7–7)
NEUTROPHILS NFR BLD AUTO: 40.4 % (ref 42.7–76)
NRBC BLD AUTO-RTO: 0 /100 WBC (ref 0–0.2)
PLATELET # BLD AUTO: 269 10*3/MM3 (ref 140–450)
PMV BLD AUTO: 10.7 FL (ref 6–12)
POTASSIUM SERPL-SCNC: 3.9 MMOL/L (ref 3.5–5.2)
PROT SERPL-MCNC: 6.4 G/DL (ref 6–8.5)
RBC # BLD AUTO: 4.23 10*6/MM3 (ref 3.77–5.28)
SODIUM SERPL-SCNC: 140 MMOL/L (ref 136–145)
T4 FREE SERPL-MCNC: >7.77 NG/DL (ref 0.93–1.7)
TSH SERPL DL<=0.05 MIU/L-ACNC: <0.005 UIU/ML (ref 0.27–4.2)
WBC # BLD AUTO: 5.29 10*3/MM3 (ref 3.4–10.8)

## 2021-06-28 PROCEDURE — 80050 GENERAL HEALTH PANEL: CPT

## 2021-06-28 PROCEDURE — 84439 ASSAY OF FREE THYROXINE: CPT

## 2021-06-28 PROCEDURE — 36415 COLL VENOUS BLD VENIPUNCTURE: CPT

## 2021-07-05 DIAGNOSIS — E05.90 HYPERTHYROIDISM: Primary | ICD-10-CM

## 2021-12-06 ENCOUNTER — TELEPHONE (OUTPATIENT)
Dept: ENDOCRINOLOGY | Facility: CLINIC | Age: 36
End: 2021-12-06

## 2021-12-08 ENCOUNTER — LAB (OUTPATIENT)
Dept: LAB | Facility: HOSPITAL | Age: 36
End: 2021-12-08

## 2021-12-08 DIAGNOSIS — E05.90 HYPERTHYROIDISM: ICD-10-CM

## 2021-12-08 LAB
T4 FREE SERPL-MCNC: 2.38 NG/DL (ref 0.93–1.7)
TSH SERPL DL<=0.05 MIU/L-ACNC: <0.005 UIU/ML (ref 0.27–4.2)

## 2021-12-08 PROCEDURE — 84439 ASSAY OF FREE THYROXINE: CPT

## 2021-12-08 PROCEDURE — 36415 COLL VENOUS BLD VENIPUNCTURE: CPT

## 2021-12-08 PROCEDURE — 84443 ASSAY THYROID STIM HORMONE: CPT

## 2021-12-15 ENCOUNTER — OFFICE VISIT (OUTPATIENT)
Dept: ENDOCRINOLOGY | Facility: CLINIC | Age: 36
End: 2021-12-15

## 2021-12-15 VITALS
SYSTOLIC BLOOD PRESSURE: 94 MMHG | HEART RATE: 94 BPM | DIASTOLIC BLOOD PRESSURE: 60 MMHG | WEIGHT: 106.8 LBS | HEIGHT: 61 IN | BODY MASS INDEX: 20.16 KG/M2 | TEMPERATURE: 97.5 F

## 2021-12-15 DIAGNOSIS — E05.90 HYPERTHYROIDISM: Primary | ICD-10-CM

## 2021-12-15 DIAGNOSIS — Z51.81 ENCOUNTER FOR MEDICATION MONITORING: ICD-10-CM

## 2021-12-15 PROCEDURE — 99213 OFFICE O/P EST LOW 20 MIN: CPT | Performed by: INTERNAL MEDICINE

## 2021-12-15 RX ORDER — ESCITALOPRAM OXALATE 10 MG/1
10 TABLET ORAL DAILY
COMMUNITY
Start: 2021-12-08 | End: 2022-06-22

## 2021-12-15 RX ORDER — LEVONORGESTREL AND ETHINYL ESTRADIOL 0.1-0.02MG
KIT ORAL
COMMUNITY
Start: 2021-09-30 | End: 2021-12-15

## 2021-12-15 RX ORDER — METHIMAZOLE 10 MG/1
TABLET ORAL
Qty: 120 TABLET | Refills: 6 | Status: SHIPPED | OUTPATIENT
Start: 2021-12-15

## 2021-12-15 RX ORDER — PROPRANOLOL HYDROCHLORIDE 10 MG/1
TABLET ORAL
Qty: 360 TABLET | Refills: 3 | Status: SHIPPED | OUTPATIENT
Start: 2021-12-15 | End: 2022-06-23

## 2021-12-16 NOTE — PROGRESS NOTES
[facility]        Patient Care Team:  Catracho Harris IV, MD as PCP - General (Internal Medicine)  Afua Moe MD as Consulting Physician (Obstetrics and Gynecology)    Chief Complaint:    Chief Complaint   Patient presents with   • Hyperthyroidism     follow up         Subjective     Here for thyroid f/u  Taking methimazole 2 tabs bid as directed  Exercise program: not much    Interval History:     Patient Complaints: went OOT 2 month ago x 2wk, forgot meds  Patient Denies:  Palpitations  History taken from: patient    Review of Systems:   Review of Systems   Constitutional: Negative for unexpected weight loss.   HENT: Negative for trouble swallowing.    Eyes: Negative for blurred vision.   Cardiovascular: Negative for palpitations.   Gastrointestinal: Negative for nausea.   Endocrine: Negative for polyuria.   Neurological: Positive for tremors. Negative for headache.     Gained 4 lb since last visit  Objective     Vital Signs  Temp:  [97.5 °F (36.4 °C)] 97.5 °F (36.4 °C)  Heart Rate:  [94] 94  BP: (94)/(60) 94/60    Physical Exam:     General Appearance:    Alert, cooperative, in no acute distress. Thin   Head:    Normocephalic, without obvious abnormality, atraumatic   Eyes:       Mouth:  Neck:       No periorbital edema. No lid lag    No lesions    32 cm in circumference, thyroid soft   Lungs:     Clear     Heart:    Regular rhythm and normal rate   Abdomen:     Normal bowel sounds, soft                 Extremities:   Moves all extremities well, no edema. Fine hand tremors               Pulses:   Pulses palpable and equal bilaterally   Skin:   Dry. No bruising or rash   Neurologic:  DTR 1+          Results Review:    I have reviewed the patient's new clinical results, labs & imaging.    Medication Review:   Prior to Admission medications    Medication Sig Start Date End Date Taking? Authorizing Provider   escitalopram (LEXAPRO) 10 MG tablet Take 10 mg by mouth Daily. 12/8/21  Yes  Stefano Foster MD   [facility] FE 1/20 1-20 MG-MCG per tablet Take 1 tablet by mouth Daily. 4/14/21  Yes Stefano Foster MD   methIMAzole (TAPAZOLE) 10 MG tablet Take 2 tabs in am, 2 tabs in pm. 12/15/21  Yes Ismael Monsivais MD   propranolol (INDERAL) 10 MG tablet TAKE 2 TABLET BY MOUTH EVERY MORNIING AND 2 tabs BEFORE SUPPER 12/15/21  Yes Ismael Monsivais MD   methIMAzole (TAPAZOLE) 10 MG tablet Take 2 tabs in am, 2 tabs in pm. 6/14/21 12/15/21 Yes Ismael Monsivais MD   propranolol (INDERAL) 10 MG tablet TAKE 1 TABLET BY MOUTH EVERY MORNIING AND 2 tabs BEFORE SUPPER  Patient taking differently: TAKE 1 TABLET BY MOUTH EVERY MORNIING AND 2 tabs BEFORE SUPPER**Patient takes it when she is having the tremors 6/14/21 12/15/21 Yes Ismael Monsivais MD   Chlorcyclizine-Pseudoephed 25-60 MG tablet Take 1 tablet by mouth Every 8 (Eight) Hours As Needed (congestion). 8/2/21 12/15/21  Adrian Mendoza APRN   ibuprofen (ADVIL,MOTRIN) 600 MG tablet Take 1 tablet by mouth Every 6 (Six) Hours As Needed for Mild Pain . 2/26/21 12/15/21  Moni Delcid APRN   Larissia 0.1-20 MG-MCG per tablet  9/30/21 12/15/21  Stefnao Foster MD   sertraline (ZOLOFT) 50 MG tablet  4/14/21 12/15/21  Stefano Foster MD       Lab Results (most recent)     None        Lab Results   Component Value Date    HGBA1C 10.7 06/02/2021      Lab Results   Component Value Date    GLUCOSE 125 (H) 06/28/2021    BUN 12 06/28/2021    CREATININE 0.41 (L) 06/28/2021    EGFRIFNONA >150 06/28/2021    BCR 29.3 (H) 06/28/2021    K 3.9 06/28/2021    CO2 25.7 06/28/2021    CALCIUM 9.2 06/28/2021    ALBUMIN 3.80 06/28/2021    LABIL2 0.9 (L) 03/10/2019    AST 24 06/28/2021    ALT 36 (H) 06/28/2021     Lab Results   Component Value Date    TSH <0.005 (L) 12/08/2021    FREET4 2.38 (H) 12/08/2021       Assessment/Plan     Diagnoses and all orders for this visit:    1. Hyperthyroidism (Primary)  -     propranolol (INDERAL) 10 MG tablet; TAKE 2  TABLET BY MOUTH EVERY MORNIING AND 2 tabs BEFORE SUPPER  Dispense: 360 tablet; Refill: 3  -     TSH; Future  -     T4, Free; Future  -     methIMAzole (TAPAZOLE) 10 MG tablet; Take 2 tabs in am, 2 tabs in pm.  Dispense: 120 tablet; Refill: 6    2. Encounter for medication monitoring  -     CBC & Differential; Future  -     Comprehensive Metabolic Panel; Future    Thyroid improved.    Continue methimazole & propranolol.  Continue exercise.        Ismael Monsivais MD  12/15/21  19:02 EST

## 2022-01-13 ENCOUNTER — TELEMEDICINE (OUTPATIENT)
Dept: FAMILY MEDICINE CLINIC | Facility: TELEHEALTH | Age: 37
End: 2022-01-13

## 2022-01-13 DIAGNOSIS — B97.89 VIRAL SINUSITIS: Primary | ICD-10-CM

## 2022-01-13 DIAGNOSIS — J32.9 VIRAL SINUSITIS: Primary | ICD-10-CM

## 2022-01-13 PROCEDURE — 99213 OFFICE O/P EST LOW 20 MIN: CPT | Performed by: NURSE PRACTITIONER

## 2022-01-13 RX ORDER — LEVONORGESTREL AND ETHINYL ESTRADIOL 0.1-0.02MG
KIT ORAL
COMMUNITY
Start: 2021-12-27 | End: 2022-06-22

## 2022-01-13 NOTE — PATIENT INSTRUCTIONS
"Flonase is recommended for daily use by most ENTs if you have Allergic or Reactive sinus problems. It will help with nasal congestion, but takes several days to become fully effective and is not a fast acting medication. It is also recommended to start and continue Claritin, Zyrtec or Allegra daily to control postnasal drainage, if you are prone to reactive sinus issues due to weather or seasonal changes. Cool mist humidifier at bedside will help secretions remain thin and more easily drain, relieving the pressure in your sinuses. Follow up with PCP, Urgent Care or Video Visit if symptoms have not resolved in 7-10 days. If symptoms worse, go to Urgent Care or follow up with PCP. If you develop high fever, chest pain, shortness of breath or any life-threatening symptoms, go to nearest Emergency Department.    Upper Respiratory Infections are typically viral in nature and will run their course in about 10 days. Management is centered around symptom relief. Symptom relief consists of drinking plenty of NON-Caffeinated fluids to thin secretions, cool mist humidifier to sooth sinus congestion and inflammation, warm compresses to face for sinus pain and pressure relief, Nasal rinses to clear mucous. Over the Counter medications can help with symptoms as follows:    NSAIDs (Non-Steroidal Anti-Inflammatory Drugs) such as Ibuprofen are more beneficial for pain and inflammation. (May not be appropriate if you have High blood Pressure, Ulcers or Hx of GI bleeding).  Mucinex can thin the mucous and secretions to help clear the \"snot\" so that you can breath easier. Blow your nose often, use nasal rinse to clear as needed.  Decongestants- oral such as Sudafed (if no heart disease or Hypertension) or nasal for 2-3 days only to help breath through your nose easier and relieve nasal congestion.  Antihistamine-use if seasonal allergies are a problem and you feel like you have drainage that could be traveling down the back of the " "throat and causing cough and/or sore throat.  Steroid nasal spray such as Flonase-help to decrease the inflammation in your nasal and sinus passages and decrease the mucous and nasal congestion.     Antibiotics are not needed unless you have persistent symptoms including nasal discharge or daytime cough not improving by 10 days, have severe symptoms such as persistent fever greater than 102, purulent nasal discharge or facial pain for more than 3 days or worsening or new onset fever, daytime cough or nasal discharge after improvement, what we call \"double sickening\".     There is always the possibility of Coronvirus, as it is difficult to distinguish from the flu or upper respiratory virus without testing. If you want to be tested, just let me know which Urgent Care is closest to you and I can set up the test, so that you can go in for the test only without having to be seen by a provider. I can get you a work excuse until the results are back which is usually at least 2-3 days.  "

## 2022-01-13 NOTE — PROGRESS NOTES
Mode of Visit: Video  Location of patient: home  You have chosen to receive care through a telehealth visit.  The patient has signed the video visit consent form.  The visit included audio and video interaction. No technical issues occurred during this visit.     Chief Complaint  Sinusitis (Sinus pressure starting yesterday)    Subjective          Ora Lynn presents to [facility]  Sinus pressure since yesterday with her children testing negative for Covid this week, they had been exposed at their . She has not been tested since she is only having sinus pressure. No fever, chills, congestion, cough, wheezing.    Sinusitis  This is a new problem. The current episode started yesterday. There has been no fever. Associated symptoms include sinus pressure. Pertinent negatives include no chills, congestion, coughing, headaches, hoarse voice, shortness of breath, sneezing, sore throat or swollen glands. Past treatments include acetaminophen. The treatment provided mild relief.     Objective   Vital Signs:   There were no vitals taken for this visit.    Physical Exam   Constitutional: She appears well-developed and well-nourished.   Pulmonary/Chest: Effort normal.     Result Review :                 Assessment and Plan    Diagnoses and all orders for this visit:    1. Viral sinusitis (Primary)  -     COVID-19,APTIMA PANTHER([place]),[facility], NP/OP SWAB IN UTM/VTM/[facility] TRANSPORT MEDIA,24 HR TAT - Swab, Nasal Cavity; Future        Flonase is recommended for daily use by most ENTs if you have Allergic or Reactive sinus problems. It will help with nasal congestion, but takes several days to become fully effective and is not a fast acting medication. It is also recommended to start and continue Claritin, Zyrtec or Allegra daily to control postnasal drainage, if you are prone to reactive sinus issues due to weather or seasonal changes. Cool mist humidifier at bedside will help  secretions remain thin and more easily drain, relieving the pressure in your sinuses. Follow up with PCP, Urgent Care or Video Visit if symptoms have not resolved in 7-10 days. If symptoms worse, go to Urgent Care or follow up with PCP. If you develop high fever, chest pain, shortness of breath or any life-threatening symptoms, go to nearest Emergency Department.        I spent 20 minutes caring for Ora on this date of service. This time includes time spent by me in the following activities:preparing for the visit, obtaining and/or reviewing a separately obtained history, performing a medically appropriate examination and/or evaluation , counseling and educating the patient/family/caregiver, ordering medications, tests, or procedures, and documenting information in the medical record  Follow Up   Return if symptoms worsen or fail to improve.  Patient was given instructions and counseling regarding her condition or for health maintenance advice. Please see specific information pulled into the AVS if appropriate.

## 2022-02-02 ENCOUNTER — TELEPHONE (OUTPATIENT)
Dept: ENDOCRINOLOGY | Facility: CLINIC | Age: 37
End: 2022-02-02

## 2022-02-05 ENCOUNTER — E-VISIT (OUTPATIENT)
Dept: FAMILY MEDICINE CLINIC | Facility: TELEHEALTH | Age: 37
End: 2022-02-05

## 2022-02-05 PROCEDURE — BRIGHTMDVISIT: Performed by: NURSE PRACTITIONER

## 2022-02-05 NOTE — E-VISIT TREATED
Chief Complaint: Bladder infection (UTI)   Patient introduction   Patient is 36-year-old female who reports dysuria, urinary frequency, and urinary urgency for < 24 hours.   Urine is described as cloudy with a strong or pungent odor.   Patient provided the following organ inventory: Presence of a vagina, ovaries, a uterus, and breasts.   Patient-submitted comments   Patient writes: Antibiotics almost always give me a yeast infection. If possible can I have an RX for Fluconazole.   Patient did not request an excuse note.   General presentation   Denies fever. Denies nausea and vomiting.   Reports mild abdominal or pelvic pain.   Denies back pain.   Reports pain in left flank. Reports difficulty starting, stopping, or delaying urination.   Denies use of OTC acetaminophen, ibuprofen, or phenazopyridine for current symptoms.   Reports previous history of UTI. Current symptoms feel exactly the same as previous UTIs. Reports receiving treatment for UTI 1-3 times in last year. Patient's last use of antibiotics for UTI was > 1 month ago.   Reports taking amoxicillin-clavulanate, cephalexin, ciprofloxacin, nitrofurantoin monohydrate, and TMP/SMX for their past UTIs.   Helpful:    Amoxicillin-clavulanate    Cephalexin    Ciprofloxacin    TMP/SMX   Not helpful:    Nitrofurantoin monohydrate   Reports developing yeast infections as a result of taking antibiotics for past UTIs.   Denies sexual activity in the past week. Denies current diaphragm use. Denies unprotected sexual intercourse with a new partner in the last 2 weeks. Denies exposure to sexually transmitted infections in the last month.   Patient denies current treatment for diabetes mellitus.   Denies the following red flags:    Recent hospitalizations or nursing home care (last 3 months)    History of renal failure    History of pyelonephritis    History of nephrolithiasis    Recent history of urological instrumentation    Anatomic abnormalities of the urinary  tract    Abnormal vaginal discharge    Visible vaginal sores    Pain with sexual intercourse    Abnormal vaginal bleeding or spotting   Pregnancy/menstrual status/breastfeeding:   Denies being pregnant. Denies breastfeeding. Regarding last menstrual period, patient writes: 1/15/2022.   Current medications   Denies taking other medications or supplements.   Medication allergies   None.   Medication contraindication review   Denies currently taking ACE inhibitors and ARBs.   Denies history of anaphylactic reaction to beta-lactams; folate deficiency; G6PD deficiency; arrhythmia; coronary artery disease; megaloblastic anemia; mononucleosis; myasthenia gravis; cholestatic jaundice; oliguria/anuria; and TMP/SMX-associated thrombocytopenia.   No known history of amoxicillin-clavulanate-associated cholestatic jaundice or nitrofurantoin-associated cholestatic jaundice.   Past medical history   Immune conditions: Denies immunocompromising conditions. Denies history of cancer.   Assessment   Uncomplicated acute UTI.   This is the likely diagnosis based on patient's reported symptoms and history, including:    Previous history of UTI    Current symptoms are exactly the same as previous UTIs    Mild pelvic or abdominal pain    Urine described as cloudy with a strong odor    Recent history of delaying urination   Plan   Medications:    fluconazole 150 mg tablet RX 150mg 1 tab PO once 1d as a single dose for vaginal yeast infection. Repeat in 72 hours if symptoms persist. Amount is 2 tab.    sulfamethoxazole 800 mg-trimethoprim 160 mg tablet RX 160mg/800mg 1 tab PO q12h 3d for infection. This medication is an antibiotic. Take it exactly as directed. You must finish the entire course of medication, even if you feel better after taking the first few doses. Amount is 6 tab.   The patient's prescriptions will be sent to:   [facility]/pharmacy #[id number] [place] [address]   Phone: [phone]     Fax: [phone]   Education:    Condition and causes    Prevention    Treatment and self-care    When to call provider   Follow-up:   Patient to follow up as needed for progression or lack of improvement in symptoms within 3d.      ----------   Electronically signed by REGIS Olmedo on 2022-02-05 at 06:58AM   ----------   Patient Interview Transcript:   Knowing about your anatomy is important for diagnosing and treating UTIs. The gender we have on file for you is female, but we realize that this might not tell the whole story. Would you like to tell us more about your anatomy?    Yes   Not selected:    No   OK, which of these organs do you have? Select all that apply.    Vagina    Ovaries    Uterus    Breasts   Not selected:    Penis    Testes    Prostate   Which of these symptoms do you have? Select all that apply.    Pain or burning while urinating    Frequent urination    Sudden urge to urinate   How long have you had these symptoms? Select one.    Less than 24 hours   Not selected:    1 to 3 days    4 to 6 days    7 to 10 days    More than 10 days   Since your current symptoms started, has it been difficult to start, stop, or delay urination? Select one.    Yes   Not selected:    No    I'm not sure   What color is your urine? Select one.    Cloudy   Not selected:    Clear    Yellow    Pink or red    I'm not sure   Does your urine smell strange (like ammonia) or stronger than usual? Select one.    Yes   Not selected:    No   Do your symptoms include any of these? Select all that apply.    Pain, pressure, or discomfort in the lower abdomen   Not selected:    Fever    Nausea    Vomiting    Back pain    No   How would you describe your lower abdominal pain, pressure, or discomfort? Select one.    Mild   Not selected:    Moderate    Severe   Do you have any flank pain? The flank is the side of the body between the ribs and the hips.    Yes, in my left flank   Not selected:    Yes, in my right flank    Yes, in both my  left and right flanks    No   Do you have any vaginal symptoms? Select all that apply.    No   Not selected:    Abnormal vaginal itching    Unscheduled or abnormal vaginal bleeding or spotting    Pain during sex    Visible sores on the vagina    Abnormal vaginal discharge   In the past 2 weeks, have you had a medical device or instrument placed in your urinary tract? Examples include catheters, stents, and nephrostomy tubes. Select one.    No   Not selected:    Yes   Have you recently been hospitalized or been a resident of a nursing home or other long-term care facility? This doesn't include emergency room (ER) visits. Select one.    No   Not selected:    Yes, within the last 2 weeks    Yes, within the last 3 months   Have you ever had severe problems with your kidneys, such as kidney failure? Select one.    No   Not selected:    Yes   Have you ever had a kidney infection (pyelonephritis)? Select one.    No   Not selected:    Yes, within the last year    Yes, over a year ago    I'm not sure   Have you ever had kidney stones? Select one.    No   Not selected:    Yes, within the last year    Yes, over a year ago    I'm not sure   Have you ever been diagnosed with any of these? Select all that apply.    No   Not selected:    Urinary reflux    Bladder diverticula    Single (or horseshoe) kidney    Duplicated urethra    I'm not sure   Have you recently held your urine for a long time after you felt the urge to go? Select one.    Yes   Not selected:    No   Have you recently avoided eating or drinking so you wouldn't have the urge to urinate as often? Select one.    No   Not selected:    Yes   Do you currently use a diaphragm? Select one.    No   Not selected:    Yes   Are you pregnant? Select one.    No   Not selected:    Yes   When was your last menstrual period? If you don't currently have periods or no longer have periods, please briefly explain.    1/15/2022   Are you breastfeeding? Select one.    No   Not  selected:    Yes   Have you had sexual intercourse in the past week? Recent sexual intercourse is a risk factor for urinary tract infections. Select one.    No   Not selected:    Yes   Have you been exposed to a sexually transmitted infection (STI or STD) in the last month? Examples include chlamydia, gonorrhea, trichomoniasis, and herpes. Select one.    No, not that I know of   Not selected:    Yes    I'm not sure   Have you had unprotected sexual intercourse with a new partner in the last 2 weeks? Select one.    No   Not selected:    Yes   Have you traveled outside of the [place] in the last 6 months? Select one.    No   Not selected:    Yes   Have you taken any of these non-prescription medications for your current symptoms? Non-prescription medications are the kind you can buy without a prescription. Select any that may apply.    No   Not selected:    Acetaminophen (Tylenol)    Ibuprofen (Advil, Motrin)    Phenazopyridine (Azo, Pyridium, Baridium, Uricalm, Uristat)   Have you ever had a urinary tract infection (UTI) before? A UTI is often called a bladder infection. Select one.    Yes   Not selected:    No    I'm not sure   How much do your current symptoms feel like past UTIs? Select one.    Exactly the same   Not selected:    Mostly the same    Somewhat the same    Totally different   In the past year, how many times have you taken antibiotics for a UTI? Select one.    1 to 3   Not selected:    0    4 or more   When did you last take antibiotics for a UTI? Select one.    More than 1 month ago   Not selected:    Less than 1 month ago   Which of these antibiotics have you taken for UTIs in the past? Select all that apply.    Amoxicillin-clavulanate (Augmentin)    Cephalexin (Daxbia, Keflex)    Ciprofloxacin (Cipro)    Nitrofurantoin (Macrobid)    TMP/SMX (Bactrim, Bactrim DS, Sulfatrim)   Not selected:    Cefdinir (Omnicef)    Cefuroxime (Ceftin)    Fosfomycin    Trimethoprim (Primsol)    Other (specify)     I'm not sure   What worked well for UTI symptoms in the past? You may not get the same medication this time. But this information helps your provider choose the best treatment for you. Select all that apply.    Amoxicillin-clavulanate (Augmentin)    Cephalexin (Daxbia, Keflex)    Ciprofloxacin (Cipro)    TMP/SMX (Bactrim, Bactrim DS, Sulfatrim)   Not selected:    Nitrofurantoin (Macrobid)    None of the above   You mentioned using trimethoprim (Primsol) or TMP/SMX (Bactrim, Bactrim DS, or Sulfatrim) in the past. Have you used these antibiotics in the last 6 months? Select one.    No   Not selected:    Yes, within the last 3 months    Yes, within the last 6 months    I'm not sure   You mentioned using ciprofloxacin (Cipro) in the past. Have you used this antibiotic in the last 3 months? Select one.    No   Not selected:    Yes    I'm not sure   Have you ever developed a yeast infection as a result of taking antibiotics? Select one.    Yes   Not selected:    No    I'm not sure   UTIs may be more serious when other factors are present. Let's address those now. Are you currently being treated for type 1 or type 2 diabetes? Select one.    No   Not selected:    Yes   Do you have any of these conditions that can affect the immune system? Scroll to see all options. Select all that apply.    None of these   Not selected:    History of bone marrow transplant    Chronic kidney disease    Chronic liver disease (including cirrhosis)    HIV/AIDS    Inflammatory bowel disease (Crohn's disease or ulcerative colitis)    Lupus    Moderate to severe plaque psoriasis    Multiple sclerosis    Rheumatoid arthritis    Sickle cell anemia    Alpha or beta thalassemia    History of solid organ transplant (kidney, liver, or heart)    History of spleen removal    An autoimmune disorder not listed here    A condition requiring treatment with long-term use of oral steroids (such as prednisone, prednisolone, or dexamethasone)   Have you ever  been diagnosed with cancer? Select one.    No   Not selected:    Yes, I have cancer now    Yes, but I'm in remission   These last few questions will help us create the right treatment plan for you. Are you currently being treated for any of these conditions? Select all that apply.    No   Not selected:    Anaphylactic reaction to beta-lactam antibiotics (penicillins, cephalosporins, carbapenems)    Folate deficiency    G6PD deficiency    Heart arrhythmia    Heart disease    Megaloblastic anemia    Mono (mononucleosis)    Myasthenia gravis    Oliguria or anuria    TMP/SMX-associated low blood platelet count (thrombocytopenia)   Have you ever had jaundice as a result of taking amoxicillin-clavulanate (Augmentin) or nitrofurantoin (Macrobid)? Select all that apply.    No   Not selected:    Yes, from amoxicillin-clavulanate (Augmentin)    Yes, from nitrofurantoin (Macrobid)   Are you taking any of these medications? Select all that apply.    No   Not selected:    An ACE inhibitor such as lisinopril, enalapril, captopril, or benazepril    An angiotensin II receptor blocker (ARB) such as candesartan, irbesartan, losartan, or valsartan   Are you taking any other medications or supplements? On the next screen, you need to list all vitamins, supplements, non-prescription medications (such as aspirin or Aleve), and prescription medications that you're taking. Select one.    No   Not selected:    Yes    Yes, but I'm not sure what they are   Have you ever had an allergic or bad reaction to any medication? Select one.    No   Not selected:    Yes   Do you need a doctor's note? A doctor's note confirms that you received care today and states when you can return to school or work. It does not contain information about your diagnosis or treatment plan. Your provider will make the final decision on whether to give you a doctor's note. Doctor's notes CANNOT be backdated. Select one.    No   Not selected:    Today only (1 day)   Is  there anything else you'd like to tell us about your symptoms?    Antibiotics almost always give me a yeast infection. If possible can I have an RX for Fluconazole   ----------   Medical history   Medical history data does not currently exist for this patient.

## 2022-02-05 NOTE — EXTERNAL PATIENT INSTRUCTIONS
Diagnosis   Urinary tract infection (UTI)   My name is Lori Harkins. I'm a healthcare provider at [facility]. After reviewing your interview, I see you have a urinary tract infection (UTI).   Medications   Your pharmacy   [facility]/pharmacy #[id number] [place] [address] [phone]     Prescription      Fluconazole (150mg): Take 1 tablet by mouth once for 1 day as a single dose. Use this medication only if you develop a yeast infection. If yeast infection symptoms are still present 3 days after taking the first tablet, take the second tablet.      TMP/SMX (160mg/800mg): Take 1 tablet by mouth every 12 hours for 3 days for infection. This medication is an antibiotic. Take it exactly as directed. You must finish the entire course of medication, even if you feel better after taking the first few doses.    Because you've developed yeast infections after taking antibiotics in the past, I've prescribed Diflucan (fluconazole). Diflucan is an oral antifungal that treats yeast infections. Use this medication only if you develop a yeast infection.   About your diagnosis   A UTI is an infection of one or more parts of the urinary tract, most commonly the bladder.   Most UTIs are caused by bacteria (usually E. coli.) that travel up the urethra and affect the bladder. I see that you have some common signs and symptoms of a UTI:    Pain or burning while urinating    Frequent urination    Sudden urge to urinate    Symptoms that feel a lot like past UTIs    Mild or moderate pain, pressure, or discomfort in your lower abdomen    Cloudy colored urine    Strange or strong smelling urine   Fortunately, most UTIs aren't serious, and they're easily treated with antibiotics. Make sure you take all of the antibiotic pills given to you. Otherwise, the UTI might come back.   What to expect   If you follow this treatment plan, you should start to feel better within 1 to 2 days.   When to seek care   Call us at 1  [phone]   with any sudden or unexpected symptoms.    Symptoms that don't improve or get worse in the next 48 hours    Fever that goes above 101F or lasts longer than 24 hours    Shaking or chills    Nausea or vomiting    Severe flank pain (pain in your back or side)   Other treatment    Rest and drink plenty of water    Urinate frequently and when you first feel the urge    Place a heating pad on your back or stomach to help relieve some of the discomfort   Prevention    Drink a lot of liquids to help flush bacteria from your system. Water is best. Try for six to eight, 8-ounce glasses a day on a regular basis.    Urinate often and when you first feel the urge. Bacteria can grow when urine stays in the bladder too long. Urinate after sex to flush away bacteria.    After using the toilet, always wipe from front to back. This step is most important after a bowel movement. Wiping from front to back prevents bacteria normally found in stool from entering the urinary tract.   Your provider   Your diagnosis was provided by Lori Harkins, a member of your trusted care team at [facility].   If you have any questions, call us at 1 [phone]  .

## 2022-02-15 ENCOUNTER — TELEPHONE (OUTPATIENT)
Dept: ENDOCRINOLOGY | Facility: CLINIC | Age: 37
End: 2022-02-15

## 2022-02-15 NOTE — TELEPHONE ENCOUNTER
Patient left voicemail stating she has been extremely tired, hair starting to fall out. Asking if she should get labs done. Has been taking her normal dosage of medication.

## 2022-02-15 NOTE — TELEPHONE ENCOUNTER
She just had COVID ( per her ph note on 2/2 ).  These symptoms could be a result of that.  However, she can get the labs in the chart drawn now & we will evaluate need for med adjustment.

## 2022-02-21 NOTE — TELEPHONE ENCOUNTER
LMOV for patient to call me back and let me know she received the message about getting her labs done

## 2022-06-10 ENCOUNTER — TELEPHONE (OUTPATIENT)
Dept: ENDOCRINOLOGY | Facility: CLINIC | Age: 37
End: 2022-06-10

## 2022-06-21 ENCOUNTER — LAB (OUTPATIENT)
Dept: LAB | Facility: HOSPITAL | Age: 37
End: 2022-06-21

## 2022-06-21 DIAGNOSIS — Z51.81 ENCOUNTER FOR MEDICATION MONITORING: ICD-10-CM

## 2022-06-21 DIAGNOSIS — E05.90 HYPERTHYROIDISM: ICD-10-CM

## 2022-06-21 LAB
ALBUMIN SERPL-MCNC: 4.6 G/DL (ref 3.5–5.2)
ALBUMIN/GLOB SERPL: 1.8 G/DL
ALP SERPL-CCNC: 72 U/L (ref 39–117)
ALT SERPL W P-5'-P-CCNC: 13 U/L (ref 1–33)
ANION GAP SERPL CALCULATED.3IONS-SCNC: 12.7 MMOL/L (ref 5–15)
AST SERPL-CCNC: 16 U/L (ref 1–32)
BASOPHILS # BLD AUTO: 0.04 10*3/MM3 (ref 0–0.2)
BASOPHILS NFR BLD AUTO: 0.5 % (ref 0–1.5)
BILIRUB SERPL-MCNC: 0.4 MG/DL (ref 0–1.2)
BUN SERPL-MCNC: 13 MG/DL (ref 6–20)
BUN/CREAT SERPL: 30.2 (ref 7–25)
CALCIUM SPEC-SCNC: 9.4 MG/DL (ref 8.6–10.5)
CHLORIDE SERPL-SCNC: 101 MMOL/L (ref 98–107)
CO2 SERPL-SCNC: 23.3 MMOL/L (ref 22–29)
CREAT SERPL-MCNC: 0.43 MG/DL (ref 0.57–1)
DEPRECATED RDW RBC AUTO: 36.7 FL (ref 37–54)
EGFRCR SERPLBLD CKD-EPI 2021: 129.5 ML/MIN/1.73
EOSINOPHIL # BLD AUTO: 0.04 10*3/MM3 (ref 0–0.4)
EOSINOPHIL NFR BLD AUTO: 0.5 % (ref 0.3–6.2)
ERYTHROCYTE [DISTWIDTH] IN BLOOD BY AUTOMATED COUNT: 12.5 % (ref 12.3–15.4)
GLOBULIN UR ELPH-MCNC: 2.5 GM/DL
GLUCOSE SERPL-MCNC: 92 MG/DL (ref 65–99)
HCT VFR BLD AUTO: 39.7 % (ref 34–46.6)
HGB BLD-MCNC: 13.6 G/DL (ref 12–15.9)
IMM GRANULOCYTES # BLD AUTO: 0.02 10*3/MM3 (ref 0–0.05)
IMM GRANULOCYTES NFR BLD AUTO: 0.3 % (ref 0–0.5)
LYMPHOCYTES # BLD AUTO: 2.38 10*3/MM3 (ref 0.7–3.1)
LYMPHOCYTES NFR BLD AUTO: 31.6 % (ref 19.6–45.3)
MCH RBC QN AUTO: 28 PG (ref 26.6–33)
MCHC RBC AUTO-ENTMCNC: 34.3 G/DL (ref 31.5–35.7)
MCV RBC AUTO: 81.7 FL (ref 79–97)
MONOCYTES # BLD AUTO: 0.45 10*3/MM3 (ref 0.1–0.9)
MONOCYTES NFR BLD AUTO: 6 % (ref 5–12)
NEUTROPHILS NFR BLD AUTO: 4.61 10*3/MM3 (ref 1.7–7)
NEUTROPHILS NFR BLD AUTO: 61.1 % (ref 42.7–76)
NRBC BLD AUTO-RTO: 0 /100 WBC (ref 0–0.2)
PLATELET # BLD AUTO: 231 10*3/MM3 (ref 140–450)
PMV BLD AUTO: 10.6 FL (ref 6–12)
POTASSIUM SERPL-SCNC: 3.7 MMOL/L (ref 3.5–5.2)
PROT SERPL-MCNC: 7.1 G/DL (ref 6–8.5)
RBC # BLD AUTO: 4.86 10*6/MM3 (ref 3.77–5.28)
SODIUM SERPL-SCNC: 137 MMOL/L (ref 136–145)
T4 FREE SERPL-MCNC: 3.01 NG/DL (ref 0.93–1.7)
TSH SERPL DL<=0.05 MIU/L-ACNC: <0.005 UIU/ML (ref 0.27–4.2)
WBC NRBC COR # BLD: 7.54 10*3/MM3 (ref 3.4–10.8)

## 2022-06-21 PROCEDURE — 80050 GENERAL HEALTH PANEL: CPT

## 2022-06-21 PROCEDURE — 84439 ASSAY OF FREE THYROXINE: CPT

## 2022-06-21 PROCEDURE — 36415 COLL VENOUS BLD VENIPUNCTURE: CPT

## 2022-06-22 ENCOUNTER — OFFICE VISIT (OUTPATIENT)
Dept: ENDOCRINOLOGY | Facility: CLINIC | Age: 37
End: 2022-06-22

## 2022-06-22 VITALS
DIASTOLIC BLOOD PRESSURE: 60 MMHG | HEART RATE: 100 BPM | SYSTOLIC BLOOD PRESSURE: 103 MMHG | BODY MASS INDEX: 20.39 KG/M2 | WEIGHT: 108 LBS | HEIGHT: 61 IN

## 2022-06-22 DIAGNOSIS — E05.90 HYPERTHYROIDISM: Primary | ICD-10-CM

## 2022-06-22 DIAGNOSIS — E05.90 HYPERTHYROIDISM: ICD-10-CM

## 2022-06-22 PROCEDURE — 99213 OFFICE O/P EST LOW 20 MIN: CPT | Performed by: INTERNAL MEDICINE

## 2022-06-22 NOTE — PATIENT INSTRUCTIONS
Ok to take methimazole all 4 tabs in am so not to forget.  Have labs rechecked in 1 month.  Will call you with the lab results.  F/u in 6 months, with labs prior.

## 2022-06-23 RX ORDER — PROPRANOLOL HYDROCHLORIDE 10 MG/1
TABLET ORAL
Qty: 180 TABLET | Refills: 3 | Status: SHIPPED | OUTPATIENT
Start: 2022-06-23 | End: 2022-09-21 | Stop reason: SDUPTHER

## 2022-06-23 NOTE — PROGRESS NOTES
[facility]        Patient Care Team:  Catracho Harris IV, MD as PCP - General (Internal Medicine)  Afua Moe MD as Consulting Physician (Obstetrics and Gynecology)    Chief Complaint:    Chief Complaint   Patient presents with   • Hyperthyroidism         Subjective     Here for thyroid f/u  Out of methimazole x 1 week the wk before last due to pharm out of product  Neither pt nor pharm notified us  Exercise program: active    Interval History:     Patient Complaints: hair loss  Patient Denies:  Palpitations  History taken from: patient    Review of Systems:   Review of Systems   Constitutional: Negative for unexpected weight loss.   HENT: Negative for trouble swallowing.    Eyes: Negative for blurred vision.   Cardiovascular: Negative for palpitations.   Gastrointestinal: Negative for nausea.   Endocrine: Negative for polyuria.   Skin:        Hair loss   Neurological: Negative for tremors and headache.     Lost  2 lb since last visit  Objective     Vital Signs  Heart Rate:  [100] 100  BP: (103)/(60) 103/60    Physical Exam:     General Appearance:    Alert, cooperative, in no acute distress   Head:    Normocephalic, without obvious abnormality, atraumatic   Eyes:       Mouth:  Neck:       No periorbital edema. No lid lag    No lesions    32 cm in circumference, thyroid soft   Lungs:     Clear    Heart:    Regular rhythm and normal rate   Abdomen:     Normal bowel sounds, soft                 Extremities:   Moves all extremities well, no edema or tremors               Pulses:   Pulses palpable and equal bilaterally   Skin:   Dry   Neurologic:  DTR 1+          Results Review:    I have reviewed the patient's new clinical results, labs & imaging.    Medication Review:   Prior to Admission medications    Medication Sig Start Date End Date Taking? Authorizing Provider   June FE 1/20 1-20 MG-MCG per tablet Take 1 tablet by mouth Daily. 4/14/21  Yes Provider, Historical, MD   methIMAzole  (TAPAZOLE) 10 MG tablet Take 2 tabs in am, 2 tabs in pm. 12/15/21  Yes Ismael Monsivais MD   propranolol (INDERAL) 10 MG tablet TAKE 2 TABLET BY MOUTH EVERY MORNIING AND 2 tabs BEFORE SUPPER 12/15/21  Yes Ismael Monsivais MD   escitalopram (LEXAPRO) 10 MG tablet Take 10 mg by mouth Daily. 12/8/21 6/22/22  ProviderStefano MD   Larissia 0.1-20 MG-MCG per tablet  12/27/21 6/22/22  Provider, MD Stefano       Lab Results (most recent)     None        Lab Results   Component Value Date    HGBA1C 10.7 06/02/2021      Lab Results   Component Value Date    GLUCOSE 92 06/21/2022    BUN 13 06/21/2022    CREATININE 0.43 (L) 06/21/2022    EGFRIFNONA >150 06/28/2021    BCR 30.2 (H) 06/21/2022    K 3.7 06/21/2022    CO2 23.3 06/21/2022    CALCIUM 9.4 06/21/2022    ALBUMIN 4.60 06/21/2022    LABIL2 0.9 (L) 03/10/2019    AST 16 06/21/2022    ALT 13 06/21/2022     Lab Results   Component Value Date    TSH <0.005 (L) 06/21/2022    FREET4 3.01 (H) 06/21/2022     WBC 7.54, Hb 13.6    Assessment & Plan     Diagnoses and all orders for this visit:    1. Hyperthyroidism (Primary)  -     TSH; Future  -     T4, Free; Future    Thyroid overactive due to being off meds.    Ok to take methimazole all 4 tabs in am so not to forget.  Have labs rechecked in 1 month.  Will call you with the lab results.        Ismael Monsivais MD  06/22/22  20:01 EDT

## 2022-06-23 NOTE — TELEPHONE ENCOUNTER
I did not see propanolol mentioned in yesterday's office note so I am not sure if OK to fill. Please advise.

## 2022-07-02 ENCOUNTER — E-VISIT (OUTPATIENT)
Dept: FAMILY MEDICINE CLINIC | Facility: TELEHEALTH | Age: 37
End: 2022-07-02

## 2022-07-02 PROCEDURE — BRIGHTMDVISIT: Performed by: NURSE PRACTITIONER

## 2022-07-02 NOTE — EXTERNAL PATIENT INSTRUCTIONS
Diagnosis   Urinary tract infection (UTI)   My name is Saskia Vasques. I'm a healthcare provider at [facility]. After reviewing your interview, I see you have a urinary tract infection (UTI).   Medications   Your pharmacy   [facility]/pharmacy #[id number] [address] [place] [address] [phone]     Prescription   Fluconazole (150mg): Take 1 tablet by mouth once for 1 day as a single dose. Use this medication only if you develop a yeast infection. If yeast infection symptoms are still present 3 days after taking the first tablet, take the second tablet.   Phenazopyridine (200mg): Take 1 tablet by mouth three times a day as needed for 2 days for pain or discomfort associated with your condition.   Nitrofurantoin monohydrate/macrocrystalline (100mg): Take 1 tablet by mouth every 12 hours for 5 days for infection. This medication is an antibiotic. Take it exactly as directed. You must finish the entire course of medication, even if you feel better after taking the first few doses.    I've given you a prescription dose of phenazopyridine. If it's more affordable or convenient, you may use the equivalent amount of non-prescription phenazopyridine. For example, instead of taking one 200 mg phenazopyridine tablet, you may take two 95 mg phenazopyridine tablets.    Because you've developed yeast infections after taking antibiotics in the past, I've prescribed Diflucan (fluconazole). Diflucan is an oral antifungal that treats yeast infections. Use this medication only if you develop a yeast infection.   About your diagnosis   A UTI is an infection of one or more parts of the urinary tract, most commonly the bladder.   Most UTIs are caused by bacteria (usually E. coli) that travel up the urethra and into the bladder. I see that you have some common signs and symptoms of a UTI:    Pain or burning while urinating    Frequent urination    Sudden urge to urinate    Symptoms that feel a lot like past UTIs    Mild or moderate  pain, pressure, or discomfort in your lower abdomen    Mild back pain    Cloudy urine    Strange or strong smelling urine   Fortunately, most UTIs aren't serious, and they're easily treated with antibiotics. Make sure you take all of the antibiotic pills given to you, even if you start to feel better after the first few doses. Otherwise, the UTI might come back.   What to expect   If you follow this treatment plan, you should start to feel better within 1 to 2 days.   When to seek care   Call us at [phone]   with any sudden or unexpected symptoms.    Symptoms that don't improve or get worse in the next 48 hours    Fever that goes above 101F or lasts longer than 24 hours    Shaking or chills    Nausea or vomiting    Severe flank pain (pain in your back or side) or pain that gets worse   Other treatment    Rest and drink plenty of water    Urinate frequently and when you first feel the urge    Place a heating pad on your back or stomach to help relieve some of the discomfort   Prevention    Drink a lot of liquids to help flush bacteria from your system. Water is best. Try for six to eight, 8-ounce glasses a day on a regular basis.    Urinate often and when you first feel the urge. Bacteria can grow when urine stays in the bladder too long. Urinate after sex to flush away bacteria.    After using the toilet, always wipe from front to back. This step is most important after a bowel movement. Wiping from front to back prevents bacteria normally found in stool from entering the urinary tract.   Your provider   Your diagnosis was provided by Saskia Vasques, a member of your trusted care team at [facility].   If you have any questions, call us at [phone]  .

## 2022-07-02 NOTE — E-VISIT TREATED
Chief Complaint: Bladder infection (UTI)   Patient introduction   Patient is 37-year-old female. Patient provided the following organ inventory: Presence of a vagina, ovaries, a uterus, and breasts.   Patient has had dysuria, frequent urination, and urinary urgency for 1 to 3 days.   Urine is cloudy with a strong or pungent odor.   Warning. The following may warrant further investigation:    Pain on left flank with nausea, suprapubic pain, pressure, or discomfort, and back pain (but no fever)   Patient-submitted comments Patient writes: Usually develop a yeat infection from any antibiotic.   Patient did not request an excuse note.   General presentation   Patient has not had a fever. Patient has nausea.   Moderate abdominal or pelvic pain.   Mild back pain.   Pain in left flank. Difficulty starting, stopping, or delaying urination.   Not taking OTC acetaminophen, ibuprofen, or phenazopyridine for current symptoms.   Previous history of UTI. Current symptoms feel exactly the same as previous UTIs. Received treatment for UTI 1 to 3 times in last year. Patient's last use of antibiotics for UTI was over 1 month ago.   Used amoxicillin-clavulanate, ciprofloxacin, and nitrofurantoin monohydrate for their past UTIs.   Helpful:    Amoxicillin-clavulanate    Ciprofloxacin   Not helpful:    Nitrofurantoin monohydrate   Previously developed yeast infections as a result of taking antibiotics for past UTIs.   No sexual intercourse in the past week. Does not use diaphragm. No unprotected sexual intercourse with a new partner in the last 2 weeks. Has not been exposed to sexually transmitted infections in the last month.   Patient is not being treated for diabetes mellitus.   Review of red flags/alarm symptoms:    No recent hospitalizations or nursing home care (last 3 months)    No history of renal failure    No history of pyelonephritis    No history of nephrolithiasis    No recent history of urologic instrumentation    No  anatomic abnormalities of the urinary tract    No abnormal vaginal discharge    No visible vaginal sores    No pain with sexual intercourse    No abnormal vaginal bleeding or spotting   Pregnancy/menstrual status/breastfeeding:   Patient is not pregnant. Patient is not breastfeeding. Regarding last menstrual period, patient writes: 6/27/2022.   Current medications   Not taking other medications or supplements.   Medication allergies   None.   Medication contraindication review   Not taking ACE inhibitors and ARBs.   No history of anaphylactic reaction to beta-lactams; folate deficiency; G6PD deficiency; arrhythmia; coronary artery disease; megaloblastic anemia; mononucleosis; myasthenia gravis; cholestatic jaundice; oliguria/anuria; and TMP/SMX-associated thrombocytopenia.   No known history of amoxicillin-clavulanate-associated cholestatic jaundice or nitrofurantoin-associated cholestatic jaundice.   Past medical history   Immune conditions: No immunocompromising conditions. No history of cancer.   Assessment   Uncomplicated acute UTI.   This is the likely diagnosis based on patient's symptoms and history, including:    Previous history of UTI    Current symptoms are exactly the same as previous UTIs    Moderate pelvic or abdominal pain that interferes with daily tasks    Mild back pain    Urine described as cloudy with a strong odor    Recent history of delaying urination    Avoiding food or drink to decrease frequency of urination   Plan   Medications:    fluconazole 150 mg tablet RX 150mg 1 tab PO once 1d as a single dose for vaginal yeast infection. Repeat in 72 hours if symptoms persist. Amount is 2 tab.    phenazopyridine 200 mg tablet RX 200mg 1 tab PO tid PRN 2d for pain or discomfort associated with your condition. Amount is 6 tab.    nitrofurantoin monohydrate/macrocrystals 100 mg capsule RX 100mg 1 cap PO q12h 5d for infection. This medication is an antibiotic. Take it exactly as directed. You must  finish the entire course of medication, even if you feel better after taking the first few doses. Amount is 10 cap.   The patient's prescriptions will be sent to:   [facility]/pharmacy #[id number]   [address] [place] [address]   Phone: [phone]     Fax: [phone]   Education:    Condition and causes    Prevention    Treatment and self-care    When to call provider   Follow-up:   Patient to follow up as needed for progression or lack of improvement in symptoms within 3d.   ----------   Electronically signed by REGIS Scott on 2022-07-02 at 12:13PM   ----------   Patient Interview Transcript:   Knowing about your anatomy is important for diagnosing and treating UTIs. The gender we have on file for you is female, but we realize that this might not tell the whole story. Would you like to tell us more about your anatomy?    Yes   Not selected:    No   OK, which of these do you have? Select all that apply.    Vagina    Ovaries    Uterus    Breasts   Not selected:    Penis    Testes    Prostate   Which of these symptoms do you have? Select all that apply.    Pain or burning while urinating    Frequent urination    Sudden urge to urinate and it's hard to hold the urine in   How long have you had these symptoms? Select one.    1 to 3 days   Not selected:    Less than 24 hours    4 to 6 days    7 to 10 days    More than 10 days   Since your current symptoms started, has it been difficult to start, stop, or delay urination? Select one.    Yes   Not selected:    No   What color is your urine? Select one.    Cloudy   Not selected:    Clear    Yellow    Pink or red   Does your urine smell strange (like ammonia) or stronger than usual? Select one.    Yes   Not selected:    No   Do you also have any of these symptoms? Select all that apply.    Nausea    Pain, pressure, or discomfort in the lower abdomen    Back pain   Not selected:    Fever    Vomiting    No   How would you describe your lower abdominal pain, pressure,  or discomfort? Select one.    Moderate; it's uncomfortable and gets in the way of doing daily tasks   Not selected:    Mild; I only notice it when I pay attention to it    Severe; I can't get comfortable, and it stops me from doing daily tasks   How would you describe your back pain? Select one.    Mild, achy pain   Not selected:    Moderate pain that gets in the way of my daily tasks    Severe, sharp pain that keeps me from my daily tasks   Do you have any flank pain? The flank is the side of the body between the ribs and the hips.    Yes, in my left flank   Not selected:    Yes, in my right flank    Yes, in both my left and right flanks    No   Do you have any of these vaginal symptoms? Select all that apply.    No   Not selected:    Abnormal vaginal itching    Unscheduled or abnormal vaginal bleeding or spotting    Pain during sex    Visible sores on the vagina    Abnormal vaginal discharge   In the past 2 weeks, have you had a medical device or instrument placed in your urinary tract? Examples include catheters, stents, and nephrostomy tubes. Select one.    No   Not selected:    Yes   Have you recently been hospitalized or been a resident of a nursing home or other long-term care facility? This doesn't include emergency room (ER) visits. Select one.    No   Not selected:    Yes, within the last 2 weeks    Yes, within the last 3 months   Have you ever had severe problems with your kidneys, such as kidney failure? Select one.    No   Not selected:    Yes   Have you ever had a kidney infection (pyelonephritis)? Select one.    No   Not selected:    Yes, within the last year    Yes, over a year ago   Have you ever had kidney stones? Select one.    No   Not selected:    Yes, within the last year    Yes, over a year ago   Have you ever been diagnosed with any of these? Select all that apply.    No   Not selected:    Urinary reflux    Bladder diverticula    Single (or horseshoe) kidney    Duplicated urethra   Have you  recently held your urine for a long time after you felt the urge to go? Select one.    Yes   Not selected:    No   Have you recently avoided eating or drinking so you wouldn't have the urge to urinate as often? Select one.    Yes   Not selected:    No   Do you use a diaphragm? Select one.    No   Not selected:    Yes   Are you pregnant? Select one.    No   Not selected:    Yes   When was your last menstrual period? If you don't currently have periods or no longer have periods, please briefly explain.    6/27/2022   Are you breastfeeding? Select one.    No   Not selected:    Yes   Have you had sexual intercourse in the past week? Recent sexual intercourse is a risk factor for urinary tract infections. Select one.    No   Not selected:    Yes   Have you been exposed to a sexually transmitted infection (STI or STD) in the last month? Examples include chlamydia, gonorrhea, trichomoniasis, and herpes. Select one.    No, not that I know of   Not selected:    Yes   Have you had unprotected sexual intercourse with a new partner in the last 2 weeks? Select one.    No   Not selected:    Yes   Have you traveled to any of these countries within the last 3 months? Recent travel to these countries may affect which medication we recommend for your symptoms. Select all that apply.    None of these   Not selected:    [place]    [place]    [place]    [place]   Have you taken any of these medications for your current symptoms? Select any that may apply.    No   Not selected:    Acetaminophen (Tylenol)    Ibuprofen (Advil, Motrin)    Phenazopyridine (Azo, Baridium, Pyridium, Uricalm, Uristat)   Have you ever had a urinary tract infection (UTI)? A UTI is often called a bladder infection or acute cystitis. Select one.    Yes   Not selected:    No, not that I know of   How much do your current symptoms feel like past UTIs? Select one.    Exactly the same   Not selected:    Mostly the same    Somewhat the same    Totally different   In the past  year, how many times have you taken antibiotics for a UTI? Select one.    1 to 3   Not selected:    0    4 or more   When did you last take antibiotics for a UTI? Select one.    More than 1 month ago   Not selected:    Less than 1 month ago   Which of these antibiotics have you taken for UTIs in the past? Select all that apply.    Amoxicillin-clavulanate (Augmentin)    Ciprofloxacin (Cipro)    Nitrofurantoin (Macrobid, Macrodantin)   Not selected:    Cefdinir    Cefuroxime    Cephalexin (Keflex)    Fosfomycin (Monurol)    Sulfamethoxazole/trimethoprim, also known as TMP/SMX (Bactrim, Bactrim DS)    Trimethoprim (Primsol)    Other (specify)    I'm not sure   What worked well for UTI symptoms in the past? You may not get the same medication this time. But this information helps your provider choose the best treatment for you. Select all that apply.    Amoxicillin-clavulanate (Augmentin)    Ciprofloxacin (Cipro)   Not selected:    Cefdinir    Cefuroxime    Cephalexin (Keflex)    Fosfomycin (Monurol)    Nitrofurantoin (Macrobid, Macrodantin)    Sulfamethoxazole/trimethoprim, also known as TMP/SMX (Bactrim, Bactrim DS)    Trimethoprim (Primsol)    Other (specify)    None of the above   You mentioned using ciprofloxacin (Cipro) in the past. Have you used this antibiotic in the last 3 months? Select one.    No   Not selected:    Yes   Have you ever developed a yeast infection as a result of taking antibiotics? Select one.    Yes   Not selected:    No, not that I know of   UTIs may be more serious when other factors are present. Let's address those now. Are you being treated for type 1 or type 2 diabetes? Select one.    No   Not selected:    Yes   Do you have any of these conditions that can affect the immune system? Scroll to see all options. Select all that apply.    None of these   Not selected:    History of bone marrow transplant    Chronic kidney disease    Chronic liver disease (including cirrhosis)    HIV/AIDS     Inflammatory bowel disease (Crohn's disease or ulcerative colitis)    Lupus    Moderate to severe plaque psoriasis    Multiple sclerosis    Rheumatoid arthritis    Sickle cell anemia    Alpha or beta thalassemia    History of solid organ transplant (kidney, liver, or heart)    History of spleen removal    An autoimmune disorder not listed here    A condition requiring treatment with long-term use of oral steroids (such as prednisone, prednisolone, or dexamethasone)   Have you ever been diagnosed with cancer? Select one.    No   Not selected:    Yes, I have cancer now    Yes, but I'm in remission   These last few questions will help us create the right treatment plan for you. Are you being treated for any of these conditions? Select all that apply.    No   Not selected:    Therese-Danlos syndrome    Folate deficiency    G6PD deficiency    High blood pressure    History of aortic aneurysm or dissection    Marfan syndrome    Megaloblastic anemia    Mono (mononucleosis)    Myasthenia gravis    Oliguria or anuria    Peripheral vascular disease   Have you ever had jaundice as a result of taking amoxicillin-clavulanate (Augmentin) or nitrofurantoin (Macrobid)? Select all that apply.    No   Not selected:    Yes, from amoxicillin-clavulanate (Augmentin)    Yes, from nitrofurantoin (Macrobid, Macrodantin)   Are you taking any of these medications? Select all that apply.    No   Not selected:    An ACE inhibitor such as lisinopril, enalapril, captopril, or benazepril    An angiotensin II receptor blocker (ARB) such as candesartan, irbesartan, losartan, or valsartan   Are you taking any other medications or supplements? On the next screen, you need to list all vitamins, supplements, non-prescription medications (such as aspirin or Aleve), and prescription medications that you're taking. Select one.    No   Not selected:    Yes    Yes, but I'm not sure what they are   Have you ever had an allergic or bad reaction to any  medication? Select one.    No   Not selected:    Yes   Do you need a doctor's note? A doctor's note confirms that you received care today and states when you can return to school or work. It does not contain information about your diagnosis or treatment plan. Your provider will make the final decision on whether to give you a doctor's note. Doctor's notes CANNOT be backdated. Select one.    No   Not selected:    Today only (1 day)   Is there anything else you'd like to tell us about your symptoms?    Usually develop a yeat infection from any antibiotic   ----------   Medical history   The following information was received from the EMR on July 02, 2022.   Allergies:    No Known Allergies   - Allergy Type:   - Reaction:   - Severity:   - Clinical Status: Active   - Verification Status: Confirmed   Medications:    PROPRANOLOL HCL 10 MG PO TABS   - Route:   - Start Date: June 23, 2022   - End Date: None   - Status: Active    METHIMAZOLE 10 MG PO TABS   - Route:   - Start Date: December 15, 2021   - End Date: None   - Status: Active    JUNEL FE 1/20 1-20 MG-MCG PO TABS   - Route: Oral   - Start Date: May 03, 2021   - End Date: None   - Status: Active   Problem list:    Miscarriage   - Category: Problem List Item   - Health Status:   - Start Date: August 25, 2019   - End Date: May 13, 2020   - Status: Resolved    Acute pharyngitis   - Category: Problem List Item   - Health Status:   - Start Date: July 13, 2020   - End Date: None   - Status: Active    Allergic reaction   - Category: Problem List Item   - Health Status:   - Start Date: July 13, 2020   - End Date: None   - Status: Active    Difficult or painful urination   - Category: Problem List Item   - Health Status:   - Start Date: July 13, 2020   - End Date: None   - Status: Active    Elevated blood pressure reading without diagnosis of hypertension   - Category: Problem List Item   - Health Status:   - Start Date: July 13, 2020   - End Date: None   - Status: Active     Fever   - Category: Problem List Item   - Health Status:   - Start Date: 2020   - End Date: None   - Status: Active    Gastroesophageal reflux disease   - Category: Problem List Item   - Health Status:   - Start Date: 2020   - End Date: May 03, 2021   - Status: Resolved    Gastritis   - Category: Problem List Item   - Health Status:   - Start Date: 2020   - End Date: None   - Status: Active    Left flank pain   - Category: Problem List Item   - Health Status:   - Start Date: 2020   - End Date: None   - Status: Active    Recurrent herpes labialis   - Category: Problem List Item   - Health Status:   - Start Date: 2020   - End Date: None   - Status: Active    Sinusitis   - Category: Problem List Item   - Health Status:   - Start Date: 2020   - End Date: None   - Status: Active    Sinusitis, acute maxillary   - Category: Problem List Item   - Health Status:   - Start Date: 2020   - End Date: None   - Status: Active    Urinary tract infection   - Category: Problem List Item   - Health Status:   - Start Date: 2020   - End Date: None   - Status: Active    Hyperthyroidism   - Category: Problem List Item   - Health Status:   - Start Date: 2020   - End Date: None   - Status: Active    11 weeks gestation of pregnancy   - Category: Problem List Item   - Health Status:   - Start Date: 2020   - End Date: 2021   - Status: Resolved    Encounter for medication monitoring   - Category: Problem List Item   - Health Status:   - Start Date: 2020   - End Date: None   - Status: Active    Pregnant   - Category: Problem List Item   - Health Status:   - Start Date: 2020   - End Date: 2021   - Status: Resolved    Pregnancy   - Category: Problem List Item   - Health Status:   - Start Date: 2021   - End Date: 2021   - Status: Resolved    , delivered   - Category:  Problem List Item   - Health Status:   - Start Date: February 24, 2021   - End Date: None   - Status: Active

## 2022-07-27 PROCEDURE — U0004 COV-19 TEST NON-CDC HGH THRU: HCPCS | Performed by: FAMILY MEDICINE

## 2022-07-28 ENCOUNTER — TELEPHONE (OUTPATIENT)
Dept: URGENT CARE | Facility: CLINIC | Age: 37
End: 2022-07-28

## 2022-07-28 NOTE — TELEPHONE ENCOUNTER
PATIENT STATES THAT SHE WAS TOLD TO CALL TODAY AFTER HER COVID TEST RESULTS CAME BACK (NEGATIVE) TO SEE WHAT YOU WILL PRESCRIBE FOR HER SYMPTOMS? SHE USES [facility] [address]

## 2022-09-03 PROCEDURE — U0004 COV-19 TEST NON-CDC HGH THRU: HCPCS

## 2022-09-12 ENCOUNTER — LAB (OUTPATIENT)
Dept: LAB | Facility: HOSPITAL | Age: 37
End: 2022-09-12

## 2022-09-12 ENCOUNTER — TRANSCRIBE ORDERS (OUTPATIENT)
Dept: ADMINISTRATIVE | Facility: HOSPITAL | Age: 37
End: 2022-09-12

## 2022-09-12 DIAGNOSIS — Z00.00 ROUTINE GENERAL MEDICAL EXAMINATION AT A HEALTH CARE FACILITY: Primary | ICD-10-CM

## 2022-09-12 DIAGNOSIS — Z00.00 ROUTINE GENERAL MEDICAL EXAMINATION AT A HEALTH CARE FACILITY: ICD-10-CM

## 2022-09-12 DIAGNOSIS — Z13.9 ENCOUNTER FOR HEALTH-RELATED SCREENING: ICD-10-CM

## 2022-09-12 PROCEDURE — 36415 COLL VENOUS BLD VENIPUNCTURE: CPT

## 2022-09-12 PROCEDURE — 80053 COMPREHEN METABOLIC PANEL: CPT

## 2022-09-12 PROCEDURE — 85025 COMPLETE CBC W/AUTO DIFF WBC: CPT

## 2022-09-12 PROCEDURE — 80061 LIPID PANEL: CPT

## 2022-09-13 LAB
ALBUMIN SERPL-MCNC: 3.8 G/DL (ref 3.5–5.2)
ALBUMIN/GLOB SERPL: 1.2 G/DL
ALP SERPL-CCNC: 55 U/L (ref 39–117)
ALT SERPL W P-5'-P-CCNC: 28 U/L (ref 1–33)
ANION GAP SERPL CALCULATED.3IONS-SCNC: 10.9 MMOL/L (ref 5–15)
AST SERPL-CCNC: 18 U/L (ref 1–32)
BASOPHILS # BLD AUTO: 0.02 10*3/MM3 (ref 0–0.2)
BASOPHILS NFR BLD AUTO: 0.4 % (ref 0–1.5)
BILIRUB SERPL-MCNC: 0.3 MG/DL (ref 0–1.2)
BUN SERPL-MCNC: 12 MG/DL (ref 6–20)
BUN/CREAT SERPL: 32.4 (ref 7–25)
CALCIUM SPEC-SCNC: 9.8 MG/DL (ref 8.6–10.5)
CHLORIDE SERPL-SCNC: 105 MMOL/L (ref 98–107)
CHOLEST SERPL-MCNC: 105 MG/DL (ref 0–200)
CO2 SERPL-SCNC: 24.1 MMOL/L (ref 22–29)
CREAT SERPL-MCNC: 0.37 MG/DL (ref 0.57–1)
DEPRECATED RDW RBC AUTO: 37 FL (ref 37–54)
EGFRCR SERPLBLD CKD-EPI 2021: 134.2 ML/MIN/1.73
EOSINOPHIL # BLD AUTO: 0.05 10*3/MM3 (ref 0–0.4)
EOSINOPHIL NFR BLD AUTO: 0.9 % (ref 0.3–6.2)
ERYTHROCYTE [DISTWIDTH] IN BLOOD BY AUTOMATED COUNT: 12.5 % (ref 12.3–15.4)
GLOBULIN UR ELPH-MCNC: 3.1 GM/DL
GLUCOSE SERPL-MCNC: 98 MG/DL (ref 65–99)
HCT VFR BLD AUTO: 35.3 % (ref 34–46.6)
HDLC SERPL-MCNC: 41 MG/DL (ref 40–60)
HGB BLD-MCNC: 11.6 G/DL (ref 12–15.9)
IMM GRANULOCYTES # BLD AUTO: 0 10*3/MM3 (ref 0–0.05)
IMM GRANULOCYTES NFR BLD AUTO: 0 % (ref 0–0.5)
LDLC SERPL CALC-MCNC: 50 MG/DL (ref 0–100)
LDLC/HDLC SERPL: 1.25 {RATIO}
LYMPHOCYTES # BLD AUTO: 2.81 10*3/MM3 (ref 0.7–3.1)
LYMPHOCYTES NFR BLD AUTO: 51.5 % (ref 19.6–45.3)
MCH RBC QN AUTO: 26.7 PG (ref 26.6–33)
MCHC RBC AUTO-ENTMCNC: 32.9 G/DL (ref 31.5–35.7)
MCV RBC AUTO: 81.1 FL (ref 79–97)
MONOCYTES # BLD AUTO: 0.61 10*3/MM3 (ref 0.1–0.9)
MONOCYTES NFR BLD AUTO: 11.2 % (ref 5–12)
NEUTROPHILS NFR BLD AUTO: 1.97 10*3/MM3 (ref 1.7–7)
NEUTROPHILS NFR BLD AUTO: 36 % (ref 42.7–76)
NRBC BLD AUTO-RTO: 0 /100 WBC (ref 0–0.2)
PLATELET # BLD AUTO: 335 10*3/MM3 (ref 140–450)
PMV BLD AUTO: 10.2 FL (ref 6–12)
POTASSIUM SERPL-SCNC: 4 MMOL/L (ref 3.5–5.2)
PROT SERPL-MCNC: 6.9 G/DL (ref 6–8.5)
RBC # BLD AUTO: 4.35 10*6/MM3 (ref 3.77–5.28)
SODIUM SERPL-SCNC: 140 MMOL/L (ref 136–145)
TRIGL SERPL-MCNC: 64 MG/DL (ref 0–150)
VLDLC SERPL-MCNC: 14 MG/DL (ref 5–40)
WBC NRBC COR # BLD: 5.46 10*3/MM3 (ref 3.4–10.8)

## 2022-09-21 ENCOUNTER — TELEMEDICINE (OUTPATIENT)
Dept: FAMILY MEDICINE CLINIC | Facility: TELEHEALTH | Age: 37
End: 2022-09-21

## 2022-09-21 DIAGNOSIS — R39.89 SUSPECTED UTI: Primary | ICD-10-CM

## 2022-09-21 DIAGNOSIS — B37.9 ANTIBIOTIC-INDUCED YEAST INFECTION: ICD-10-CM

## 2022-09-21 DIAGNOSIS — T36.95XA ANTIBIOTIC-INDUCED YEAST INFECTION: ICD-10-CM

## 2022-09-21 PROCEDURE — 99213 OFFICE O/P EST LOW 20 MIN: CPT | Performed by: NURSE PRACTITIONER

## 2022-09-21 RX ORDER — SULFAMETHOXAZOLE AND TRIMETHOPRIM 800; 160 MG/1; MG/1
1 TABLET ORAL 2 TIMES DAILY
Qty: 14 TABLET | Refills: 0 | Status: SHIPPED | OUTPATIENT
Start: 2022-09-21 | End: 2023-01-19

## 2022-09-21 RX ORDER — FLUCONAZOLE 150 MG/1
TABLET ORAL
Qty: 2 TABLET | Refills: 0 | Status: SHIPPED | OUTPATIENT
Start: 2022-09-21 | End: 2023-01-19

## 2022-09-21 RX ORDER — NITROFURANTOIN 25; 75 MG/1; MG/1
100 CAPSULE ORAL 2 TIMES DAILY
Qty: 14 CAPSULE | Refills: 0 | Status: SHIPPED | OUTPATIENT
Start: 2022-09-21 | End: 2022-09-21 | Stop reason: ALTCHOICE

## 2022-09-21 RX ORDER — PROPRANOLOL HYDROCHLORIDE 40 MG/1
40 TABLET ORAL
COMMUNITY
Start: 2022-09-13 | End: 2023-03-12

## 2022-09-21 RX ORDER — PHENAZOPYRIDINE HYDROCHLORIDE 200 MG/1
200 TABLET, FILM COATED ORAL 3 TIMES DAILY PRN
Qty: 6 TABLET | Refills: 0 | Status: SHIPPED | OUTPATIENT
Start: 2022-09-21 | End: 2022-09-23

## 2022-09-21 RX ORDER — PROMETHAZINE HYDROCHLORIDE 12.5 MG/1
12.5 TABLET ORAL
COMMUNITY
Start: 2022-09-19 | End: 2022-09-24

## 2022-09-21 RX ORDER — SULFAMETHOXAZOLE AND TRIMETHOPRIM 800; 160 MG/1; MG/1
1 TABLET ORAL 2 TIMES DAILY
Qty: 14 TABLET | Refills: 0 | Status: SHIPPED | OUTPATIENT
Start: 2022-09-21 | End: 2022-09-21

## 2022-09-21 NOTE — PROGRESS NOTES
You have chosen to receive care through a telehealth visit.  Do you consent to use a video/audio connection for your medical care today? Yes     CHIEF COMPLAINT  No chief complaint on file.        HPI  Ora Lynn is a 37 y.o. female  presents with complaint of dysuria, frequency, urgency, pressure in lower abdomen.  She denies fever/chills, n/v, back pain, hematuria, or vaginal symptoms.  She has had UTIs in the past which resolved with antibiotic therapy.     Review of Systems   Constitutional: Negative for fever.   Gastrointestinal: Negative for abdominal pain.   Genitourinary: Positive for dysuria, frequency and urgency. Negative for flank pain and hematuria.   Musculoskeletal: Negative for back pain.   All other systems reviewed and are negative.      Past Medical History:   Diagnosis Date   • Depression    • Hyperthyroidism    • Thyroid disease        Family History   Problem Relation Age of Onset   • Alcohol abuse Father    • Diabetes Paternal Aunt        Social History     Socioeconomic History   • Marital status: Single   Tobacco Use   • Smoking status: Never Smoker   • Smokeless tobacco: Never Used   Vaping Use   • Vaping Use: Never used   Substance and Sexual Activity   • Alcohol use: Not Currently     Alcohol/week: 1.0 standard drink     Types: 1 Glasses of wine per week   • Drug use: Defer   • Sexual activity: Not Currently     Partners: Male       Ora Lynn  reports that she has never smoked. She has never used smokeless tobacco..              LMP 08/22/2022 (Exact Date)     PHYSICAL EXAM  Physical Exam   Constitutional: She is oriented to person, place, and time. She appears well-developed and well-nourished. She does not have a sickly appearance. She does not appear ill.   HENT:   Head: Normocephalic and atraumatic.   Pulmonary/Chest: Effort normal.  No respiratory distress.  Neurological: She is alert and oriented to person, place, and time.         Diagnoses and all orders for this  visit:    1. Suspected UTI (Primary)  -     nitrofurantoin, macrocrystal-monohydrate, (MACROBID) 100 MG capsule; Take 1 capsule by mouth 2 (Two) Times a Day for 7 days.  Dispense: 14 capsule; Refill: 0  -     phenazopyridine (PYRIDIUM) 200 MG tablet; Take 1 tablet by mouth 3 (Three) Times a Day As Needed for Bladder Spasms for up to 2 days.  Dispense: 6 tablet; Refill: 0    2. Antibiotic-induced yeast infection  -     fluconazole (DIFLUCAN) 150 MG tablet; Take 1 tablet now, repeat in 72 hours if symptoms continue  Dispense: 2 tablet; Refill: 0    --take medications as prescribed  --increase intake of water  --f/u in 2-3 days if no improvement       FOLLOW-UP  As discussed during visit with PCP/[facility] if no improvement or Urgent Care/Emergency Department if worsening of symptoms    Patient verbalizes understanding of medication dosage, comfort measures, instructions for treatment and follow-up.    REGIS Mendoza  09/21/2022  13:10 EDT    The use of a video visit has been reviewed with the patient and verbal informed consent has been obtained. Myself and Ora Lynn participated in this visit. The patient is located in [address] [place] [address] [place] [facility].    I am located in [place], [place]. Mychart and Zoom were utilized. I spent 20 minutes in the patient's chart for this visit.    Addendum:  Patient reports Macrobid is causing Nausea and vomiting. Macrobid d/c'ed. Bactrim DS one po bid for 7 days. Disp. # 14 No refill.   Saskia STACY

## 2022-09-21 NOTE — PATIENT INSTRUCTIONS
Urinary Tract Infection, Adult  A urinary tract infection (UTI) is an infection of any part of the urinary tract. The urinary tract includes the kidneys, ureters, bladder, and urethra. These organs make, store, and get rid of urine in the body.  An upper UTI affects the ureters and kidneys. A lower UTI affects the bladder and urethra.  What are the causes?  Most urinary tract infections are caused by bacteria in your genital area around your urethra, where urine leaves your body. These bacteria grow and cause inflammation of your urinary tract.  What increases the risk?  You are more likely to develop this condition if:  You have a urinary catheter that stays in place.  You are not able to control when you urinate or have a bowel movement (incontinence).  You are female and you:  Use a spermicide or diaphragm for birth control.  Have low estrogen levels.  Are pregnant.  You have certain genes that increase your risk.  You are sexually active.  You take antibiotic medicines.  You have a condition that causes your flow of urine to slow down, such as:  An enlarged prostate, if you are male.  Blockage in your urethra.  A kidney stone.  A nerve condition that affects your bladder control (neurogenic bladder).  Not getting enough to drink, or not urinating often.  You have certain medical conditions, such as:  Diabetes.  A weak disease-fighting system (immunesystem).  Sickle cell disease.  Gout.  Spinal cord injury.  What are the signs or symptoms?  Symptoms of this condition include:  Needing to urinate right away (urgency).  Frequent urination. This may include small amounts of urine each time you urinate.  Pain or burning with urination.  Blood in the urine.  Urine that smells bad or unusual.  Trouble urinating.  Cloudy urine.  Vaginal discharge, if you are female.  Pain in the abdomen or the lower back.  You may also have:  Vomiting or a decreased appetite.  Confusion.  Irritability or tiredness.  A fever or  chills.  Diarrhea.  The first symptom in older adults may be confusion. In some cases, they may not have any symptoms until the infection has worsened.  How is this diagnosed?  This condition is diagnosed based on your medical history and a physical exam. You may also have other tests, including:  Urine tests.  Blood tests.  Tests for STIs (sexually transmitted infections).  If you have had more than one UTI, a cystoscopy or imaging studies may be done to determine the cause of the infections.  How is this treated?  Treatment for this condition includes:  Antibiotic medicine.  Over-the-counter medicines to treat discomfort.  Drinking enough water to stay hydrated.  If you have frequent infections or have other conditions such as a kidney stone, you may need to see a health care provider who specializes in the urinary tract (urologist).  In rare cases, urinary tract infections can cause sepsis. Sepsis is a life-threatening condition that occurs when the body responds to an infection. Sepsis is treated in the hospital with IV antibiotics, fluids, and other medicines.  Follow these instructions at home:  Medicines  Take over-the-counter and prescription medicines only as told by your health care provider.  If you were prescribed an antibiotic medicine, take it as told by your health care provider. Do not stop using the antibiotic even if you start to feel better.  General instructions  Make sure you:  Empty your bladder often and completely. Do not hold urine for long periods of time.  Empty your bladder after sex.  Wipe from front to back after urinating or having a bowel movement if you are female. Use each tissue only one time when you wipe.  Drink enough fluid to keep your urine pale yellow.  Keep all follow-up visits. This is important.  Contact a health care provider if:  Your symptoms do not get better after 1-2 days.  Your symptoms go away and then return.  Get help right away if:  You have severe pain in your  back or your lower abdomen.  You have a fever or chills.  You have nausea or vomiting.  Summary  A urinary tract infection (UTI) is an infection of any part of the urinary tract, which includes the kidneys, ureters, bladder, and urethra.  Most urinary tract infections are caused by bacteria in your genital area.  Treatment for this condition often includes antibiotic medicines.  If you were prescribed an antibiotic medicine, take it as told by your health care provider. Do not stop using the antibiotic even if you start to feel better.  Keep all follow-up visits. This is important.  This information is not intended to replace advice given to you by your health care provider. Make sure you discuss any questions you have with your health care provider.  Document Revised: 07/30/2021 Document Reviewed: 07/30/2021  Elsevier Patient Education © 2022 [employer]

## 2022-11-27 ENCOUNTER — E-VISIT (OUTPATIENT)
Dept: ADMINISTRATIVE | Facility: OTHER | Age: 37
End: 2022-11-27

## 2022-11-27 NOTE — E-VISIT ESCALATED
Chief Complaint: Eye pain, eye bump, or irritation   Patient was shown the following escalation message:   Some conditions need a visit with a healthcare provider   Redness around the colored part of the eye may suggest a complicated condition. You should speak with a provider to get care.   ----------   Patient Interview Transcript:   Which of these symptoms are bothering you? Select all that apply.    Redness in the whites of the eye(s)    Eye drainage (such as excess tears, mucus, or pus)   Not selected:    Eye dryness    Eyelid swelling    Eyelid redness    Crusting of the eyelids or eyelashes    Bump or pimple on or inside the eyelid    None of the above   Do you have any of these eye symptoms? Select all that apply.    Itchiness   Not selected:    Burning, sravanthi, or gritty feeling    Pain inside the eyeball    Eyelid pain    Flaking or dry skin on the eyelid    None of the above   How long have you had your eye symptoms? Select one.    Less than 1 day   Not selected:    1 to 3 days    4 to 7 days    More than 7 days   Which eye is affected? Select one.    My left eye   Not selected:    My right eye    Both eyes    It started in one eye, but now both eyes are affected   Does it feel like there's something in your eye that's making it hard to open your eye or keep it open? Select one.    No   Not selected:    Yes   Is the skin around your eye red, swollen, or warm to the touch? Pay special attention to the area above your eyelid, the upper cheek, forehead, and the top part of the nose (between the eyes). Select one.    No   Not selected:    Yes   How would you describe your eye redness? Select one.    B. It looks like small blood vessels, but the redness is much worse around the colored part of the eye   Not selected:    A. It looks like small blood vessels spread throughout the white part of the eye    C. There's a spot of red in just one part of the white area    D. None of the above   ----------   Medical  history   Medical history data does not currently exist for this patient.

## 2023-01-19 ENCOUNTER — TELEMEDICINE (OUTPATIENT)
Dept: FAMILY MEDICINE CLINIC | Facility: TELEHEALTH | Age: 38
End: 2023-01-19
Payer: MEDICAID

## 2023-01-19 DIAGNOSIS — R39.89 SUSPECTED UTI: Primary | ICD-10-CM

## 2023-01-19 DIAGNOSIS — B37.9 ANTIBIOTIC-INDUCED YEAST INFECTION: ICD-10-CM

## 2023-01-19 DIAGNOSIS — T36.95XA ANTIBIOTIC-INDUCED YEAST INFECTION: ICD-10-CM

## 2023-01-19 PROCEDURE — 99213 OFFICE O/P EST LOW 20 MIN: CPT | Performed by: NURSE PRACTITIONER

## 2023-01-19 RX ORDER — FLUCONAZOLE 150 MG/1
TABLET ORAL
Qty: 2 TABLET | Refills: 0 | Status: SHIPPED | OUTPATIENT
Start: 2023-01-19

## 2023-01-19 RX ORDER — NITROFURANTOIN 25; 75 MG/1; MG/1
100 CAPSULE ORAL 2 TIMES DAILY
Qty: 14 CAPSULE | Refills: 0 | Status: SHIPPED | OUTPATIENT
Start: 2023-01-19 | End: 2023-01-26

## 2023-01-19 RX ORDER — VALACYCLOVIR HYDROCHLORIDE 1 G/1
2000 TABLET, FILM COATED ORAL
COMMUNITY
Start: 2023-01-12

## 2023-01-19 RX ORDER — PHENAZOPYRIDINE HYDROCHLORIDE 200 MG/1
200 TABLET, FILM COATED ORAL 3 TIMES DAILY PRN
Qty: 6 TABLET | Refills: 0 | Status: SHIPPED | OUTPATIENT
Start: 2023-01-19 | End: 2023-01-21

## 2023-01-19 RX ORDER — PROMETHAZINE HYDROCHLORIDE 25 MG/1
TABLET ORAL
COMMUNITY
Start: 2022-11-22

## 2023-01-19 NOTE — PROGRESS NOTES
You have chosen to receive care through a telehealth visit.  Do you consent to use a video/audio connection for your medical care today? Yes     CHIEF COMPLAINT  No chief complaint on file.        HPI  Ora Lynn is a 37 y.o. female  presents with complaint of 1 day history of dysuria, frequency, urgency, pressure in lower abdomen.  She denies fever/chills, n/v, back pain, hematuria, or vaginal symptoms at this time.  She has a history of UT infections that resolve with antibiotic therapy.    Review of Systems   See HPI    Past Medical History:   Diagnosis Date   • Depression    • Hyperthyroidism    • Thyroid disease        Family History   Problem Relation Age of Onset   • Alcohol abuse Father    • Diabetes Paternal Aunt        Social History     Socioeconomic History   • Marital status: Single   Tobacco Use   • Smoking status: Never   • Smokeless tobacco: Never   Vaping Use   • Vaping Use: Never used   Substance and Sexual Activity   • Alcohol use: Not Currently     Alcohol/week: 1.0 standard drink     Types: 1 Glasses of wine per week   • Drug use: Defer   • Sexual activity: Not Currently     Partners: Male       Ora Lynn  reports that she has never smoked. She has never used smokeless tobacco..              There were no vitals taken for this visit.    PHYSICAL EXAM  Physical Exam   Constitutional: She is oriented to person, place, and time. She appears well-developed and well-nourished. She does not have a sickly appearance. She does not appear ill.   HENT:   Head: Normocephalic and atraumatic.   Pulmonary/Chest: Effort normal.  No respiratory distress.  Neurological: She is alert and oriented to person, place, and time.         Diagnoses and all orders for this visit:    1. Suspected UTI (Primary)  -     nitrofurantoin, macrocrystal-monohydrate, (MACROBID) 100 MG capsule; Take 1 capsule by mouth 2 (Two) Times a Day for 7 days.  Dispense: 14 capsule; Refill: 0  -     phenazopyridine (PYRIDIUM) 200  MG tablet; Take 1 tablet by mouth 3 (Three) Times a Day As Needed for Bladder Spasms for up to 2 days.  Dispense: 6 tablet; Refill: 0    2. Antibiotic-induced yeast infection  -     fluconazole (DIFLUCAN) 150 MG tablet; Take 1 tablet now, repeat in 72 hours if symptoms continue  Dispense: 2 tablet; Refill: 0    --take medications as prescribed  --increase fluids, rest as needed, tylenol or ibuprofen for pain  --f/u in 2-3 days if no improvement        FOLLOW-UP  As discussed during visit with PCP/[facility] if no improvement or Urgent Care/Emergency Department if worsening of symptoms    Patient verbalizes understanding of medication dosage, comfort measures, instructions for treatment and follow-up.    Hanane Orellana, APRN  01/19/2023  10:32 EST    The use of a video visit has been reviewed with the patient and verbal informed consent has been obtained. Myself and Ora Lynn participated in this visit. The patient is located in [address] [place] [address] [place] [facility].    I am located in [place], [place]. Mychart and Twilio were utilized. I spent 8 minutes in the patient's chart for this visit.